# Patient Record
Sex: MALE | Race: WHITE
[De-identification: names, ages, dates, MRNs, and addresses within clinical notes are randomized per-mention and may not be internally consistent; named-entity substitution may affect disease eponyms.]

---

## 2017-10-26 ENCOUNTER — APPOINTMENT (OUTPATIENT)
Dept: RADIOLOGY | Facility: CLINIC | Age: 62
End: 2017-10-26
Payer: COMMERCIAL

## 2017-10-26 ENCOUNTER — OUTPATIENT (OUTPATIENT)
Dept: OUTPATIENT SERVICES | Facility: HOSPITAL | Age: 62
LOS: 1 days | End: 2017-10-26
Payer: COMMERCIAL

## 2017-10-26 DIAGNOSIS — Z00.8 ENCOUNTER FOR OTHER GENERAL EXAMINATION: ICD-10-CM

## 2017-10-26 PROBLEM — Z00.00 ENCOUNTER FOR PREVENTIVE HEALTH EXAMINATION: Status: ACTIVE | Noted: 2017-10-26

## 2017-10-26 PROCEDURE — 73564 X-RAY EXAM KNEE 4 OR MORE: CPT | Mod: 26,LT

## 2017-10-26 PROCEDURE — 73564 X-RAY EXAM KNEE 4 OR MORE: CPT

## 2019-08-04 ENCOUNTER — TRANSCRIPTION ENCOUNTER (OUTPATIENT)
Age: 64
End: 2019-08-04

## 2020-09-08 DIAGNOSIS — Z01.818 ENCOUNTER FOR OTHER PREPROCEDURAL EXAMINATION: ICD-10-CM

## 2020-09-11 ENCOUNTER — APPOINTMENT (OUTPATIENT)
Dept: DISASTER EMERGENCY | Facility: CLINIC | Age: 65
End: 2020-09-11

## 2020-09-11 LAB — SARS-COV-2 N GENE NPH QL NAA+PROBE: NOT DETECTED

## 2020-09-14 ENCOUNTER — RESULT REVIEW (OUTPATIENT)
Age: 65
End: 2020-09-14

## 2020-09-14 ENCOUNTER — OUTPATIENT (OUTPATIENT)
Dept: OUTPATIENT SERVICES | Facility: HOSPITAL | Age: 65
LOS: 1 days | Discharge: HOME | End: 2020-09-14
Payer: COMMERCIAL

## 2020-09-14 VITALS
HEART RATE: 67 BPM | TEMPERATURE: 97 F | RESPIRATION RATE: 23 BRPM | SYSTOLIC BLOOD PRESSURE: 133 MMHG | OXYGEN SATURATION: 99 % | WEIGHT: 285.06 LBS | DIASTOLIC BLOOD PRESSURE: 83 MMHG | HEIGHT: 70 IN

## 2020-09-14 DIAGNOSIS — D17.79 BENIGN LIPOMATOUS NEOPLASM OF OTHER SITES: Chronic | ICD-10-CM

## 2020-09-14 DIAGNOSIS — K42.9 UMBILICAL HERNIA WITHOUT OBSTRUCTION OR GANGRENE: Chronic | ICD-10-CM

## 2020-09-14 DIAGNOSIS — Z90.49 ACQUIRED ABSENCE OF OTHER SPECIFIED PARTS OF DIGESTIVE TRACT: Chronic | ICD-10-CM

## 2020-09-14 DIAGNOSIS — Z80.0 FAMILY HISTORY OF MALIGNANT NEOPLASM OF DIGESTIVE ORGANS: ICD-10-CM

## 2020-09-14 DIAGNOSIS — K46.9 UNSPECIFIED ABDOMINAL HERNIA WITHOUT OBSTRUCTION OR GANGRENE: Chronic | ICD-10-CM

## 2020-09-14 DIAGNOSIS — K22.2 ESOPHAGEAL OBSTRUCTION: ICD-10-CM

## 2020-09-14 PROCEDURE — 93005 ELECTROCARDIOGRAM TRACING: CPT

## 2020-09-14 PROCEDURE — 93010 ELECTROCARDIOGRAM REPORT: CPT

## 2020-09-14 PROCEDURE — 88305 TISSUE EXAM BY PATHOLOGIST: CPT

## 2020-09-14 PROCEDURE — 88313 SPECIAL STAINS GROUP 2: CPT

## 2020-09-14 PROCEDURE — 88305 TISSUE EXAM BY PATHOLOGIST: CPT | Mod: 26

## 2020-09-14 PROCEDURE — C1726: CPT

## 2020-09-14 PROCEDURE — 88313 SPECIAL STAINS GROUP 2: CPT | Mod: 26

## 2020-09-14 NOTE — ASU PATIENT PROFILE, ADULT - PMH
Adenoma    Rheumatoid arthritis involving shoulder with positive rheumatoid factor, unspecified laterality    Schatzki's ring    Sleep apnea, unspecified type

## 2020-09-16 DIAGNOSIS — D12.4 BENIGN NEOPLASM OF DESCENDING COLON: ICD-10-CM

## 2020-09-16 DIAGNOSIS — Z12.11 ENCOUNTER FOR SCREENING FOR MALIGNANT NEOPLASM OF COLON: ICD-10-CM

## 2020-09-16 DIAGNOSIS — M06.9 RHEUMATOID ARTHRITIS, UNSPECIFIED: ICD-10-CM

## 2020-09-16 DIAGNOSIS — K44.9 DIAPHRAGMATIC HERNIA WITHOUT OBSTRUCTION OR GANGRENE: ICD-10-CM

## 2020-09-16 DIAGNOSIS — D12.0 BENIGN NEOPLASM OF CECUM: ICD-10-CM

## 2020-09-16 DIAGNOSIS — K22.2 ESOPHAGEAL OBSTRUCTION: ICD-10-CM

## 2020-09-16 DIAGNOSIS — R13.10 DYSPHAGIA, UNSPECIFIED: ICD-10-CM

## 2020-09-16 DIAGNOSIS — K64.8 OTHER HEMORRHOIDS: ICD-10-CM

## 2020-09-16 DIAGNOSIS — K31.7 POLYP OF STOMACH AND DUODENUM: ICD-10-CM

## 2020-09-16 DIAGNOSIS — Z86.010 PERSONAL HISTORY OF COLONIC POLYPS: ICD-10-CM

## 2020-09-16 DIAGNOSIS — G47.30 SLEEP APNEA, UNSPECIFIED: ICD-10-CM

## 2020-09-16 DIAGNOSIS — Z80.0 FAMILY HISTORY OF MALIGNANT NEOPLASM OF DIGESTIVE ORGANS: ICD-10-CM

## 2021-10-16 ENCOUNTER — NON-APPOINTMENT (OUTPATIENT)
Age: 66
End: 2021-10-16

## 2021-11-16 ENCOUNTER — APPOINTMENT (OUTPATIENT)
Dept: ORTHOPEDIC SURGERY | Facility: CLINIC | Age: 66
End: 2021-11-16
Payer: MEDICARE

## 2021-11-16 ENCOUNTER — NON-APPOINTMENT (OUTPATIENT)
Age: 66
End: 2021-11-16

## 2021-11-16 VITALS
BODY MASS INDEX: 41.46 KG/M2 | HEIGHT: 70 IN | SYSTOLIC BLOOD PRESSURE: 133 MMHG | WEIGHT: 289.6 LBS | HEART RATE: 50 BPM | DIASTOLIC BLOOD PRESSURE: 84 MMHG

## 2021-11-16 DIAGNOSIS — Z82.49 FAMILY HISTORY OF ISCHEMIC HEART DISEASE AND OTHER DISEASES OF THE CIRCULATORY SYSTEM: ICD-10-CM

## 2021-11-16 DIAGNOSIS — M25.561 PAIN IN RIGHT KNEE: ICD-10-CM

## 2021-11-16 DIAGNOSIS — Z83.3 FAMILY HISTORY OF DIABETES MELLITUS: ICD-10-CM

## 2021-11-16 DIAGNOSIS — Z80.0 FAMILY HISTORY OF MALIGNANT NEOPLASM OF DIGESTIVE ORGANS: ICD-10-CM

## 2021-11-16 DIAGNOSIS — M25.562 PAIN IN RIGHT KNEE: ICD-10-CM

## 2021-11-16 DIAGNOSIS — Z86.69 PERSONAL HISTORY OF OTHER DISEASES OF THE NERVOUS SYSTEM AND SENSE ORGANS: ICD-10-CM

## 2021-11-16 DIAGNOSIS — M17.11 UNILATERAL PRIMARY OSTEOARTHRITIS, RIGHT KNEE: ICD-10-CM

## 2021-11-16 PROCEDURE — 99203 OFFICE O/P NEW LOW 30 MIN: CPT

## 2021-11-16 PROCEDURE — 73562 X-RAY EXAM OF KNEE 3: CPT | Mod: 50

## 2021-11-16 PROCEDURE — 72170 X-RAY EXAM OF PELVIS: CPT | Mod: 59

## 2021-11-16 RX ORDER — AMLODIPINE BESYLATE 5 MG/1
5 TABLET ORAL
Refills: 0 | Status: ACTIVE | COMMUNITY

## 2022-02-22 ENCOUNTER — OUTPATIENT (OUTPATIENT)
Dept: OUTPATIENT SERVICES | Facility: HOSPITAL | Age: 67
LOS: 1 days | End: 2022-02-22
Payer: MEDICARE

## 2022-02-22 VITALS
OXYGEN SATURATION: 96 % | WEIGHT: 279.33 LBS | RESPIRATION RATE: 14 BRPM | SYSTOLIC BLOOD PRESSURE: 130 MMHG | HEIGHT: 70 IN | DIASTOLIC BLOOD PRESSURE: 74 MMHG | HEART RATE: 53 BPM | TEMPERATURE: 97 F

## 2022-02-22 DIAGNOSIS — Z90.49 ACQUIRED ABSENCE OF OTHER SPECIFIED PARTS OF DIGESTIVE TRACT: Chronic | ICD-10-CM

## 2022-02-22 DIAGNOSIS — M17.11 UNILATERAL PRIMARY OSTEOARTHRITIS, RIGHT KNEE: ICD-10-CM

## 2022-02-22 DIAGNOSIS — D17.79 BENIGN LIPOMATOUS NEOPLASM OF OTHER SITES: Chronic | ICD-10-CM

## 2022-02-22 DIAGNOSIS — Z98.890 OTHER SPECIFIED POSTPROCEDURAL STATES: Chronic | ICD-10-CM

## 2022-02-22 DIAGNOSIS — K42.9 UMBILICAL HERNIA WITHOUT OBSTRUCTION OR GANGRENE: Chronic | ICD-10-CM

## 2022-02-22 DIAGNOSIS — Z98.49 CATARACT EXTRACTION STATUS, UNSPECIFIED EYE: Chronic | ICD-10-CM

## 2022-02-22 DIAGNOSIS — K46.9 UNSPECIFIED ABDOMINAL HERNIA WITHOUT OBSTRUCTION OR GANGRENE: Chronic | ICD-10-CM

## 2022-02-22 DIAGNOSIS — Z01.818 ENCOUNTER FOR OTHER PREPROCEDURAL EXAMINATION: ICD-10-CM

## 2022-02-22 LAB
A1C WITH ESTIMATED AVERAGE GLUCOSE RESULT: 5.7 % — HIGH (ref 4–5.6)
ALBUMIN SERPL ELPH-MCNC: 3.6 G/DL — SIGNIFICANT CHANGE UP (ref 3.3–5)
ALP SERPL-CCNC: 73 U/L — SIGNIFICANT CHANGE UP (ref 30–120)
ALT FLD-CCNC: 22 U/L DA — SIGNIFICANT CHANGE UP (ref 10–60)
ANION GAP SERPL CALC-SCNC: 6 MMOL/L — SIGNIFICANT CHANGE UP (ref 5–17)
APTT BLD: 31.9 SEC — SIGNIFICANT CHANGE UP (ref 27.5–35.5)
AST SERPL-CCNC: 12 U/L — SIGNIFICANT CHANGE UP (ref 10–40)
BILIRUB SERPL-MCNC: 0.4 MG/DL — SIGNIFICANT CHANGE UP (ref 0.2–1.2)
BUN SERPL-MCNC: 19 MG/DL — SIGNIFICANT CHANGE UP (ref 7–23)
CALCIUM SERPL-MCNC: 8.3 MG/DL — LOW (ref 8.4–10.5)
CHLORIDE SERPL-SCNC: 105 MMOL/L — SIGNIFICANT CHANGE UP (ref 96–108)
CO2 SERPL-SCNC: 26 MMOL/L — SIGNIFICANT CHANGE UP (ref 22–31)
CREAT SERPL-MCNC: 1.08 MG/DL — SIGNIFICANT CHANGE UP (ref 0.5–1.3)
ESTIMATED AVERAGE GLUCOSE: 117 MG/DL — HIGH (ref 68–114)
GLUCOSE SERPL-MCNC: 103 MG/DL — HIGH (ref 70–99)
HCT VFR BLD CALC: 47.3 % — SIGNIFICANT CHANGE UP (ref 39–50)
HGB BLD-MCNC: 15.7 G/DL — SIGNIFICANT CHANGE UP (ref 13–17)
INR BLD: 1.04 RATIO — SIGNIFICANT CHANGE UP (ref 0.88–1.16)
MCHC RBC-ENTMCNC: 30 PG — SIGNIFICANT CHANGE UP (ref 27–34)
MCHC RBC-ENTMCNC: 33.2 GM/DL — SIGNIFICANT CHANGE UP (ref 32–36)
MCV RBC AUTO: 90.4 FL — SIGNIFICANT CHANGE UP (ref 80–100)
MRSA PCR RESULT.: SIGNIFICANT CHANGE UP
NRBC # BLD: 0 /100 WBCS — SIGNIFICANT CHANGE UP (ref 0–0)
PLATELET # BLD AUTO: 218 K/UL — SIGNIFICANT CHANGE UP (ref 150–400)
POTASSIUM SERPL-MCNC: 3.7 MMOL/L — SIGNIFICANT CHANGE UP (ref 3.5–5.3)
POTASSIUM SERPL-SCNC: 3.7 MMOL/L — SIGNIFICANT CHANGE UP (ref 3.5–5.3)
PROT SERPL-MCNC: 6.9 G/DL — SIGNIFICANT CHANGE UP (ref 6–8.3)
PROTHROM AB SERPL-ACNC: 12.6 SEC — SIGNIFICANT CHANGE UP (ref 10.6–13.6)
RBC # BLD: 5.23 M/UL — SIGNIFICANT CHANGE UP (ref 4.2–5.8)
RBC # FLD: 13.2 % — SIGNIFICANT CHANGE UP (ref 10.3–14.5)
S AUREUS DNA NOSE QL NAA+PROBE: SIGNIFICANT CHANGE UP
SODIUM SERPL-SCNC: 137 MMOL/L — SIGNIFICANT CHANGE UP (ref 135–145)
WBC # BLD: 9.25 K/UL — SIGNIFICANT CHANGE UP (ref 3.8–10.5)
WBC # FLD AUTO: 9.25 K/UL — SIGNIFICANT CHANGE UP (ref 3.8–10.5)

## 2022-02-22 PROCEDURE — 85027 COMPLETE CBC AUTOMATED: CPT

## 2022-02-22 PROCEDURE — G0463: CPT

## 2022-02-22 PROCEDURE — 93005 ELECTROCARDIOGRAM TRACING: CPT

## 2022-02-22 PROCEDURE — 80053 COMPREHEN METABOLIC PANEL: CPT

## 2022-02-22 PROCEDURE — 87641 MR-STAPH DNA AMP PROBE: CPT

## 2022-02-22 PROCEDURE — 85730 THROMBOPLASTIN TIME PARTIAL: CPT

## 2022-02-22 PROCEDURE — 93010 ELECTROCARDIOGRAM REPORT: CPT

## 2022-02-22 PROCEDURE — 87640 STAPH A DNA AMP PROBE: CPT

## 2022-02-22 PROCEDURE — 36415 COLL VENOUS BLD VENIPUNCTURE: CPT

## 2022-02-22 PROCEDURE — 83036 HEMOGLOBIN GLYCOSYLATED A1C: CPT

## 2022-02-22 PROCEDURE — 85610 PROTHROMBIN TIME: CPT

## 2022-02-22 RX ORDER — AMLODIPINE BESYLATE 2.5 MG/1
1 TABLET ORAL
Qty: 0 | Refills: 0 | DISCHARGE

## 2022-02-22 NOTE — H&P PST ADULT - FUNCTIONAL ASSESSMENT - BASIC MOBILITY PT AGE POP HIDDEN
Adult DISPLAY PLAN FREE TEXT DISPLAY PLAN FREE TEXT DISPLAY PLAN FREE TEXT DISPLAY PLAN FREE TEXT DISPLAY PLAN FREE TEXT DISPLAY PLAN FREE TEXT

## 2022-02-22 NOTE — H&P PST ADULT - NSICDXFAMILYHX_GEN_ALL_CORE_FT
FAMILY HISTORY:  Father  Still living? No  Family history of colon cancer, Age at diagnosis: Age Unknown    Mother  Still living? No  FHx: kidney failure, Age at diagnosis: Age Unknown

## 2022-02-22 NOTE — H&P PST ADULT - VENOUS THROMBOEMBOLISM
DISCHARGE                         6/10/2019  3:57 PM  ----------------------------------------------------------------------------  Discharged to: Home  Via: private transportation  Accompanied by: Family  Discharge Instructions: *diet, *activity, medications, follow up appointments, when to call the MD, aftercare instructions.  Prescriptions: To be filled by discharge pharmacy; medication list reviewed & sent with pt  Follow Up Appointments: arranged; information given  Belongings: All sent with pt  IV: d/c'd  Telemetry: d/c'd  Pt exhibits understanding of above discharge instructions; all questions answered.    Discharge Paperwork: Signed, copied, and sent home with patient.     Pt cannot find wallet, family will call when they arrive home if found.    no

## 2022-02-22 NOTE — H&P PST ADULT - MUSCULOSKELETAL
details… bilateral knees/no joint swelling/no joint erythema/no joint warmth/no calf tenderness/decreased ROM/decreased ROM due to pain/diminished strength detailed exam

## 2022-02-22 NOTE — H&P PST ADULT - FUNCTIONAL ASSESSMENT - BASIC MOBILITY SCORE.
----- Message from Ingrid Hodges sent at 10/16/2018  3:51 PM CDT -----  Contact: [place] [phone]  Patient is returning a call to the staff. But does not know what it was in regards to. Please call at your earliest convenience.  -------------------------------------------  10/16/18 @ 1651 ([facility])  CALL ATTEMPT TO PT UNSUCCESSFUL , MESSAGE LEFT FOR PT TO RETURN CALL TO OFFICE CONCERNING PAP RESULTS   24

## 2022-02-22 NOTE — H&P PST ADULT - NSICDXPASTSURGICALHX_GEN_ALL_CORE_FT
PAST SURGICAL HISTORY:  H/O arthroscopy of left knee 2018    H/O cataract extraction bilateral 2019    H/O inguinal hernia repair bilateral with umbilical hernia repair 2001    Lipoma of hand 2017    S/P laparoscopic cholecystectomy 2014

## 2022-02-22 NOTE — H&P PST ADULT - HISTORY OF PRESENT ILLNESS
65 yo male presents with 3-4 year history of right knee pain. He received "gel shots" at that time with only 2 days of pain relief. He states that he has treated the pain with tylenol with temporary relief. Pain is 4/10 at rest and increased to 7-8/10 with activity. Pain is currently relieved with extra strength tylenol. He also reports pain in the left knee and states that it will need to be replaced sometime in the future. He has had a prior arthroscopy of the left knee.

## 2022-02-22 NOTE — H&P PST ADULT - PROBLEM SELECTOR PLAN 1
right TKR. medical and cardiac clearances requested. instructed to take amlodipine AM of surgery with sips of water. Patient is a [employer] and will not accept blood transfusions. ERAS and surgical wash instructions reviewed and verbalized understanding. covid PCR appt. confirmed for 3/11/22 at 0800.

## 2022-02-22 NOTE — H&P PST ADULT - RESPIRATORY RATE (BREATHS/MIN)
Received Choice form at 3737  Agency/Facility Name: [facility]  Referral sent per Choice form @ 7030      14

## 2022-02-22 NOTE — H&P PST ADULT - FALL HARM RISK - PATIENT NEEDS ASSISTANCE
"Interdisciplinary Assessment    Music Therapy     Occupational Therapy     Recreation Therapy    SUMMARY:  Pt attended and participated in a structured occupational therapy group session.  During check-in, pt reported feeling \"tired.\"  Pt identified \"myself\" as a support system.  The focus of the group was on making a greeting card for someone else.  Pt wrote on one card for the entire hour. On the envelope, pt wrote, \"To: my horrible parents. From: your depressed son.\" Pt was smiling and laughing while saying this. Pt encouraged to write a card for people who are supportive to him or to write a card for himself when he is feeling sad, down, upset however declined all offers.  Pt had difficulty with focus and attention to task. Easily distracted and appeared more focused on socializing with peers.       Pt filled out occupational therapy assessment.  He identified \"being alive\" as his greatest obstacle to daily life and \"friends\" as the best part of his life.  One thing he would like to change is \"everything.\" He stated being in the hospital makes him feel \"trapped.\"  He identified \"no one\" as social supports and when stressed/overwhelmed, \"listens to music\" to calm down. \"Nothing\" gives him hope for the future.     Patient selected goals:  To be able to set and accomplish goals  To try new things and take risks  To ask others for help or support when needed  \"By the time I leave the hospital I will\"...\"be able to ask for help or support.\"  CLINICAL OBSERVATIONS:             07/29/17 1400   General Information   Date Initially Attended OT 07/28/17   Clinical Impression   Affect Appropriate to situation   Orientation Oriented to person, place and time   Appearance and ADLs General cleanliness observed in most areas   Attention to Internal Stimuli No observed signs   Interaction Skills Interacts appropriately with staff;Interacts appropriately with peers   Ability to Communicate Needs Independent;Indirect   Verbal " Content Articulate;Clear;Appropriate to topic   Ability to Maintain Boundaries Maintains appropriate physical boundaries;Maintains appropriate verbal boundaries   Participation Participates with frequent encouragement   Concentration Concentrates 10-20 minutes;Needs further assessment   Ability to Concentrate Easily distracted;Needs further assessment   Follows and Comprehends Directions Independently follows multi-step directions   Memory Delayed and immediate recall intact   Organization Needs further assessment   Decision Making Independent   Planning and Problem Solving Occasionally needs assist/feedback   Ability to Apply and Learn Concepts Needs further assessment   Frustrations / Stress Tolerance Independently identifies sources of frustration/stress;Independently identifies skills ;Needs further assessment   Level of Insight No insight   Self Esteem Can identify positives   Social Supports Unable to identify any supports                                                                              RECOMMENDATIONS:                                                                                                              .  During individual or group occupational therapy, music therapy or recreational therapy, pt will explore and apply interventions to focus on helping patient to regulate impulse control, learn methods  of dealing with stressors and feelings,  learn to control negative impulses and acting out behaviors, and increase ability to express/manage  anger in appropriate and non-violent ways. Assist patient with exploring satisfying alternatives to aggressive behaviors such as physical outlets for redirection of angry feelings, hobbies, or other individual pursuits.     ADDITIONAL NOTES AND PLAN:                                                                                                        .   None at this time.  Therapists contributing to assessment:  Mode Crowe, SUDHAKAR, OTR/L       No assistance needed

## 2022-02-22 NOTE — H&P PST ADULT - NSICDXPASTMEDICALHX_GEN_ALL_CORE_FT
PAST MEDICAL HISTORY:  COVID-19 vaccine series completed     Gastric ulcer as child    Hypertension     ALISTAIR on CPAP     Osteoarthritis     Schatzki's ring endoscopy every 3 years and stricture is "stretched", last 2021     PAST MEDICAL HISTORY:  COVID-19 vaccine series completed     Gastric ulcer as child    Hypertension     Morbid obesity with BMI of 40.0-44.9, adult     ALISTAIR on CPAP     Osteoarthritis     Schatzki's ring endoscopy every 3 years and stricture is "stretched", last 2021

## 2022-03-03 PROBLEM — E66.01 MORBID (SEVERE) OBESITY DUE TO EXCESS CALORIES: Chronic | Status: ACTIVE | Noted: 2022-02-22

## 2022-03-03 PROBLEM — K25.9 GASTRIC ULCER, UNSPECIFIED AS ACUTE OR CHRONIC, WITHOUT HEMORRHAGE OR PERFORATION: Chronic | Status: ACTIVE | Noted: 2022-02-22

## 2022-03-03 PROBLEM — Z92.29 PERSONAL HISTORY OF OTHER DRUG THERAPY: Chronic | Status: ACTIVE | Noted: 2022-02-22

## 2022-03-03 PROBLEM — G47.33 OBSTRUCTIVE SLEEP APNEA (ADULT) (PEDIATRIC): Chronic | Status: ACTIVE | Noted: 2022-02-22

## 2022-03-03 PROBLEM — K22.2 ESOPHAGEAL OBSTRUCTION: Chronic | Status: ACTIVE | Noted: 2020-09-14

## 2022-03-03 PROBLEM — I10 ESSENTIAL (PRIMARY) HYPERTENSION: Chronic | Status: ACTIVE | Noted: 2022-02-22

## 2022-03-03 PROBLEM — M19.90 UNSPECIFIED OSTEOARTHRITIS, UNSPECIFIED SITE: Chronic | Status: ACTIVE | Noted: 2022-02-22

## 2022-03-11 ENCOUNTER — OUTPATIENT (OUTPATIENT)
Dept: OUTPATIENT SERVICES | Facility: HOSPITAL | Age: 67
LOS: 1 days | End: 2022-03-11
Payer: MEDICARE

## 2022-03-11 VITALS
RESPIRATION RATE: 11 BRPM | HEART RATE: 55 BPM | HEIGHT: 70 IN | DIASTOLIC BLOOD PRESSURE: 79 MMHG | WEIGHT: 274.7 LBS | SYSTOLIC BLOOD PRESSURE: 132 MMHG | TEMPERATURE: 98 F

## 2022-03-11 DIAGNOSIS — Z98.49 CATARACT EXTRACTION STATUS, UNSPECIFIED EYE: Chronic | ICD-10-CM

## 2022-03-11 DIAGNOSIS — Z98.890 OTHER SPECIFIED POSTPROCEDURAL STATES: Chronic | ICD-10-CM

## 2022-03-11 DIAGNOSIS — Z01.818 ENCOUNTER FOR OTHER PREPROCEDURAL EXAMINATION: ICD-10-CM

## 2022-03-11 DIAGNOSIS — Z90.49 ACQUIRED ABSENCE OF OTHER SPECIFIED PARTS OF DIGESTIVE TRACT: Chronic | ICD-10-CM

## 2022-03-11 DIAGNOSIS — D17.79 BENIGN LIPOMATOUS NEOPLASM OF OTHER SITES: Chronic | ICD-10-CM

## 2022-03-11 DIAGNOSIS — Z20.828 CONTACT WITH AND (SUSPECTED) EXPOSURE TO OTHER VIRAL COMMUNICABLE DISEASES: ICD-10-CM

## 2022-03-11 DIAGNOSIS — M17.11 UNILATERAL PRIMARY OSTEOARTHRITIS, RIGHT KNEE: ICD-10-CM

## 2022-03-11 LAB — SARS-COV-2 RNA SPEC QL NAA+PROBE: SIGNIFICANT CHANGE UP

## 2022-03-11 PROCEDURE — U0005: CPT

## 2022-03-11 PROCEDURE — U0003: CPT

## 2022-03-11 NOTE — ASU PATIENT PROFILE, ADULT - PATIENT'S HEIGHT AND WEIGHT RECORDED IN THE VITAL SIGNS FLOWSHEET
Per verbal order by RANJANA Cueto, I removed impacted cerumen from the patient's Bilateral ear(s) using warm water mixed 3:1 with Hydrogen Peroxide,using the Elephant Ear flushing device. Patient tolerated the procedure well. Approximately 15 minutes spent with minimal effort with patient, resulting in Bilateral clear ear canals.        yes

## 2022-03-11 NOTE — ASU PATIENT PROFILE, ADULT - FALL HARM RISK - UNIVERSAL INTERVENTIONS
Bed in lowest position, wheels locked, appropriate side rails in place/Call bell, personal items and telephone in reach/Instruct patient to call for assistance before getting out of bed or chair/Non-slip footwear when patient is out of bed/[place] to call system/Physically safe environment - no spills, clutter or unnecessary equipment/Purposeful Proactive Rounding/Room/bathroom lighting operational, light cord in reach

## 2022-03-11 NOTE — ASU PATIENT PROFILE, ADULT - NSICDXPASTMEDICALHX_GEN_ALL_CORE_FT
PAST MEDICAL HISTORY:  COVID-19 vaccine series completed     Gastric ulcer as child    Hypertension     Morbid obesity with BMI of 40.0-44.9, adult     ALISTAIR on CPAP     Osteoarthritis     Schatzki's ring endoscopy every 3 years and stricture is "stretched", last 2021

## 2022-03-13 ENCOUNTER — FORM ENCOUNTER (OUTPATIENT)
Age: 67
End: 2022-03-13

## 2022-03-14 ENCOUNTER — RESULT REVIEW (OUTPATIENT)
Age: 67
End: 2022-03-14

## 2022-03-14 ENCOUNTER — APPOINTMENT (OUTPATIENT)
Dept: ORTHOPEDIC SURGERY | Facility: HOSPITAL | Age: 67
End: 2022-03-14

## 2022-03-14 ENCOUNTER — OUTPATIENT (OUTPATIENT)
Dept: OUTPATIENT SERVICES | Facility: HOSPITAL | Age: 67
LOS: 1 days | End: 2022-03-14
Payer: MEDICARE

## 2022-03-14 ENCOUNTER — TRANSCRIPTION ENCOUNTER (OUTPATIENT)
Age: 67
End: 2022-03-14

## 2022-03-14 DIAGNOSIS — Z90.49 ACQUIRED ABSENCE OF OTHER SPECIFIED PARTS OF DIGESTIVE TRACT: Chronic | ICD-10-CM

## 2022-03-14 DIAGNOSIS — M17.11 UNILATERAL PRIMARY OSTEOARTHRITIS, RIGHT KNEE: ICD-10-CM

## 2022-03-14 DIAGNOSIS — Z98.49 CATARACT EXTRACTION STATUS, UNSPECIFIED EYE: Chronic | ICD-10-CM

## 2022-03-14 DIAGNOSIS — Z98.890 OTHER SPECIFIED POSTPROCEDURAL STATES: Chronic | ICD-10-CM

## 2022-03-14 DIAGNOSIS — D17.79 BENIGN LIPOMATOUS NEOPLASM OF OTHER SITES: Chronic | ICD-10-CM

## 2022-03-14 DIAGNOSIS — Z01.818 ENCOUNTER FOR OTHER PREPROCEDURAL EXAMINATION: ICD-10-CM

## 2022-03-14 PROCEDURE — 27447 TOTAL KNEE ARTHROPLASTY: CPT | Mod: RT

## 2022-03-14 PROCEDURE — 99222 1ST HOSP IP/OBS MODERATE 55: CPT

## 2022-03-14 PROCEDURE — 88305 TISSUE EXAM BY PATHOLOGIST: CPT | Mod: 26

## 2022-03-14 PROCEDURE — 73560 X-RAY EXAM OF KNEE 1 OR 2: CPT | Mod: 26,RT

## 2022-03-14 PROCEDURE — 88311 DECALCIFY TISSUE: CPT | Mod: 26

## 2022-03-14 DEVICE — COMP PATELLA TRI-PEG E-PLUS POLY 9X35MM: Type: IMPLANTABLE DEVICE | Status: FUNCTIONAL

## 2022-03-14 DEVICE — INSERT TIB EMPOWR VVC SZ 8 12MM: Type: IMPLANTABLE DEVICE | Status: FUNCTIONAL

## 2022-03-14 DEVICE — INSERT TIB NONPOROUS UNIV SZ  8 RT: Type: IMPLANTABLE DEVICE | Status: FUNCTIONAL

## 2022-03-14 DEVICE — IMPLANTABLE DEVICE: Type: IMPLANTABLE DEVICE | Status: FUNCTIONAL

## 2022-03-14 DEVICE — BONE WAX 2.5GM: Type: IMPLANTABLE DEVICE | Status: FUNCTIONAL

## 2022-03-14 DEVICE — CEMENT BONE COBALT G-HV: Type: IMPLANTABLE DEVICE | Status: FUNCTIONAL

## 2022-03-14 DEVICE — COMP FEM NON POROUS SZ 8 RT: Type: IMPLANTABLE DEVICE | Status: FUNCTIONAL

## 2022-03-14 RX ORDER — PANTOPRAZOLE SODIUM 20 MG/1
40 TABLET, DELAYED RELEASE ORAL
Refills: 0 | Status: DISCONTINUED | OUTPATIENT
Start: 2022-03-14 | End: 2022-03-28

## 2022-03-14 RX ORDER — SODIUM CHLORIDE 9 MG/ML
500 INJECTION INTRAMUSCULAR; INTRAVENOUS; SUBCUTANEOUS ONCE
Refills: 0 | Status: COMPLETED | OUTPATIENT
Start: 2022-03-14 | End: 2022-03-14

## 2022-03-14 RX ORDER — ACETAMINOPHEN 500 MG
1000 TABLET ORAL ONCE
Refills: 0 | Status: COMPLETED | OUTPATIENT
Start: 2022-03-14 | End: 2022-03-14

## 2022-03-14 RX ORDER — SODIUM CHLORIDE 9 MG/ML
1000 INJECTION INTRAMUSCULAR; INTRAVENOUS; SUBCUTANEOUS ONCE
Refills: 0 | Status: COMPLETED | OUTPATIENT
Start: 2022-03-14 | End: 2022-03-14

## 2022-03-14 RX ORDER — ONDANSETRON 8 MG/1
4 TABLET, FILM COATED ORAL ONCE
Refills: 0 | Status: DISCONTINUED | OUTPATIENT
Start: 2022-03-14 | End: 2022-03-14

## 2022-03-14 RX ORDER — ASPIRIN/CALCIUM CARB/MAGNESIUM 324 MG
1 TABLET ORAL
Qty: 83 | Refills: 0
Start: 2022-03-14 | End: 2022-04-24

## 2022-03-14 RX ORDER — HYDROMORPHONE HYDROCHLORIDE 2 MG/ML
0.5 INJECTION INTRAMUSCULAR; INTRAVENOUS; SUBCUTANEOUS
Refills: 0 | Status: DISCONTINUED | OUTPATIENT
Start: 2022-03-14 | End: 2022-03-14

## 2022-03-14 RX ORDER — CEFAZOLIN SODIUM 1 G
3000 VIAL (EA) INJECTION ONCE
Refills: 0 | Status: COMPLETED | OUTPATIENT
Start: 2022-03-14 | End: 2022-03-14

## 2022-03-14 RX ORDER — CELECOXIB 200 MG/1
1 CAPSULE ORAL
Qty: 60 | Refills: 0
Start: 2022-03-14 | End: 2022-04-12

## 2022-03-14 RX ORDER — ASPIRIN/CALCIUM CARB/MAGNESIUM 324 MG
81 TABLET ORAL
Refills: 0 | Status: DISCONTINUED | OUTPATIENT
Start: 2022-03-15 | End: 2022-03-28

## 2022-03-14 RX ORDER — CELECOXIB 200 MG/1
200 CAPSULE ORAL EVERY 12 HOURS
Refills: 0 | Status: DISCONTINUED | OUTPATIENT
Start: 2022-03-14 | End: 2022-03-28

## 2022-03-14 RX ORDER — OMEPRAZOLE 10 MG/1
1 CAPSULE, DELAYED RELEASE ORAL
Qty: 30 | Refills: 1
Start: 2022-03-14 | End: 2022-05-12

## 2022-03-14 RX ORDER — ACETAMINOPHEN 500 MG
1000 TABLET ORAL EVERY 8 HOURS
Refills: 0 | Status: DISCONTINUED | OUTPATIENT
Start: 2022-03-14 | End: 2022-03-28

## 2022-03-14 RX ORDER — MAGNESIUM HYDROXIDE 400 MG/1
30 TABLET, CHEWABLE ORAL DAILY
Refills: 0 | Status: DISCONTINUED | OUTPATIENT
Start: 2022-03-14 | End: 2022-03-28

## 2022-03-14 RX ORDER — SENNA PLUS 8.6 MG/1
2 TABLET ORAL AT BEDTIME
Refills: 0 | Status: DISCONTINUED | OUTPATIENT
Start: 2022-03-14 | End: 2022-03-28

## 2022-03-14 RX ORDER — CHLORHEXIDINE GLUCONATE 213 G/1000ML
1 SOLUTION TOPICAL ONCE
Refills: 0 | Status: COMPLETED | OUTPATIENT
Start: 2022-03-14 | End: 2022-03-14

## 2022-03-14 RX ORDER — CEFAZOLIN SODIUM 1 G
3000 VIAL (EA) INJECTION EVERY 8 HOURS
Refills: 0 | Status: COMPLETED | OUTPATIENT
Start: 2022-03-14 | End: 2022-03-15

## 2022-03-14 RX ORDER — OXYCODONE HYDROCHLORIDE 5 MG/1
10 TABLET ORAL
Refills: 0 | Status: DISCONTINUED | OUTPATIENT
Start: 2022-03-14 | End: 2022-03-14

## 2022-03-14 RX ORDER — POLYETHYLENE GLYCOL 3350 17 G/17G
17 POWDER, FOR SOLUTION ORAL AT BEDTIME
Refills: 0 | Status: DISCONTINUED | OUTPATIENT
Start: 2022-03-14 | End: 2022-03-28

## 2022-03-14 RX ORDER — OXYCODONE HYDROCHLORIDE 5 MG/1
5 TABLET ORAL
Refills: 0 | Status: DISCONTINUED | OUTPATIENT
Start: 2022-03-14 | End: 2022-03-14

## 2022-03-14 RX ORDER — ONDANSETRON 8 MG/1
4 TABLET, FILM COATED ORAL EVERY 6 HOURS
Refills: 0 | Status: DISCONTINUED | OUTPATIENT
Start: 2022-03-14 | End: 2022-03-28

## 2022-03-14 RX ORDER — SODIUM CHLORIDE 9 MG/ML
1000 INJECTION, SOLUTION INTRAVENOUS
Refills: 0 | Status: DISCONTINUED | OUTPATIENT
Start: 2022-03-14 | End: 2022-03-14

## 2022-03-14 RX ORDER — HYDROMORPHONE HYDROCHLORIDE 2 MG/ML
0.5 INJECTION INTRAMUSCULAR; INTRAVENOUS; SUBCUTANEOUS
Refills: 0 | Status: DISCONTINUED | OUTPATIENT
Start: 2022-03-14 | End: 2022-03-21

## 2022-03-14 RX ORDER — AMLODIPINE BESYLATE 2.5 MG/1
5 TABLET ORAL DAILY
Refills: 0 | Status: DISCONTINUED | OUTPATIENT
Start: 2022-03-16 | End: 2022-03-28

## 2022-03-14 RX ORDER — SODIUM CHLORIDE 9 MG/ML
1000 INJECTION, SOLUTION INTRAVENOUS
Refills: 0 | Status: DISCONTINUED | OUTPATIENT
Start: 2022-03-14 | End: 2022-03-28

## 2022-03-14 RX ORDER — TRANEXAMIC ACID 100 MG/ML
1000 INJECTION, SOLUTION INTRAVENOUS ONCE
Refills: 0 | Status: COMPLETED | OUTPATIENT
Start: 2022-03-14 | End: 2022-03-14

## 2022-03-14 RX ORDER — DEXAMETHASONE 0.5 MG/5ML
8 ELIXIR ORAL ONCE
Refills: 0 | Status: COMPLETED | OUTPATIENT
Start: 2022-03-15 | End: 2022-03-15

## 2022-03-14 RX ORDER — APREPITANT 80 MG/1
40 CAPSULE ORAL ONCE
Refills: 0 | Status: COMPLETED | OUTPATIENT
Start: 2022-03-14 | End: 2022-03-14

## 2022-03-14 RX ORDER — CEFAZOLIN SODIUM 1 G
2000 VIAL (EA) INJECTION EVERY 8 HOURS
Refills: 0 | Status: DISCONTINUED | OUTPATIENT
Start: 2022-03-14 | End: 2022-03-14

## 2022-03-14 RX ADMIN — Medication 200 MILLIGRAM(S): at 16:54

## 2022-03-14 RX ADMIN — APREPITANT 40 MILLIGRAM(S): 80 CAPSULE ORAL at 06:40

## 2022-03-14 RX ADMIN — Medication 1000 MILLIGRAM(S): at 16:36

## 2022-03-14 RX ADMIN — SODIUM CHLORIDE 100 MILLILITER(S): 9 INJECTION, SOLUTION INTRAVENOUS at 20:59

## 2022-03-14 RX ADMIN — Medication 1000 MILLIGRAM(S): at 21:00

## 2022-03-14 RX ADMIN — OXYCODONE HYDROCHLORIDE 5 MILLIGRAM(S): 5 TABLET ORAL at 20:59

## 2022-03-14 RX ADMIN — SENNA PLUS 2 TABLET(S): 8.6 TABLET ORAL at 21:00

## 2022-03-14 RX ADMIN — SODIUM CHLORIDE 500 MILLILITER(S): 9 INJECTION INTRAMUSCULAR; INTRAVENOUS; SUBCUTANEOUS at 16:53

## 2022-03-14 RX ADMIN — CHLORHEXIDINE GLUCONATE 1 APPLICATION(S): 213 SOLUTION TOPICAL at 06:38

## 2022-03-14 RX ADMIN — POLYETHYLENE GLYCOL 3350 17 GRAM(S): 17 POWDER, FOR SOLUTION ORAL at 21:00

## 2022-03-14 RX ADMIN — CELECOXIB 200 MILLIGRAM(S): 200 CAPSULE ORAL at 21:00

## 2022-03-14 RX ADMIN — Medication 400 MILLIGRAM(S): at 16:22

## 2022-03-14 RX ADMIN — SODIUM CHLORIDE 75 MILLILITER(S): 9 INJECTION, SOLUTION INTRAVENOUS at 11:11

## 2022-03-14 RX ADMIN — OXYCODONE HYDROCHLORIDE 5 MILLIGRAM(S): 5 TABLET ORAL at 21:30

## 2022-03-14 RX ADMIN — CELECOXIB 200 MILLIGRAM(S): 200 CAPSULE ORAL at 22:07

## 2022-03-14 RX ADMIN — Medication 1000 MILLIGRAM(S): at 21:41

## 2022-03-14 RX ADMIN — SODIUM CHLORIDE 500 MILLILITER(S): 9 INJECTION INTRAMUSCULAR; INTRAVENOUS; SUBCUTANEOUS at 11:11

## 2022-03-14 NOTE — PHYSICAL THERAPY INITIAL EVALUATION ADULT - GAIT DEVIATIONS NOTED, PT EVAL
decreased crispin/decreased velocity of limb motion/decreased step length/decreased weight-shifting ability

## 2022-03-14 NOTE — OCCUPATIONAL THERAPY INITIAL EVALUATION ADULT - ADL RETRAINING, OT EVAL
Patient will tolerate standing at the sink for 3-5 minutes grooming independently using RW. Patient will complete UB dressing with independence seated at the edge of bed in 1-2 therapy sessions.

## 2022-03-14 NOTE — OCCUPATIONAL THERAPY INITIAL EVALUATION ADULT - PERTINENT HX OF CURRENT PROBLEM, REHAB EVAL
67 y/o male p/w 3-4 year history of right knee pain. He received "gel shots" at that time with only 2 days of pain relief. He states that he has treated the pain with tylenol with temporary relief. Pain is 4/10 at rest and increased to 7-8/10 with activity.  3/14 s/p R TKR

## 2022-03-14 NOTE — OCCUPATIONAL THERAPY INITIAL EVALUATION ADULT - DIAGNOSIS, OT EVAL
Patient presents with decreased strength, balance, safety awareness and independence for ADLs and functional transfers.

## 2022-03-14 NOTE — OCCUPATIONAL THERAPY INITIAL EVALUATION ADULT - ADDITIONAL COMMENTS
Pt lives in a private home with his wife with 2 steps to enter and 2 steps up to his bathroom with no handrail. Pt has a tub shower with a curtain. Pt reports to be completely independent with ADLs, IADLs and functional t/fs prior to elective surgery.

## 2022-03-14 NOTE — DISCHARGE NOTE PROVIDER - HOSPITAL COURSE
This patient was admitted to [facility] with a history of severe degenerative joint disease of the right knee.  Patient underwent Pre-Surgical Testing and was medically cleared to undergo elective procedure. Patient underwent right TKR by Dr. Charbel Rivera on 3/14/22. Procedure was well tolerated.  No operative or ceferino-operative complications arose during patient's hospital course.  Patient received antibiotic according to SCIP guidelines for infection prevention.  Aspirin 81mg q 12 h was given for DVT prophylaxis, in addition to the use of SCDs.  Anesthesia, Medical Hospitalist, Physical Therapy and Occupational Therapy were consulted. Patient is stable for discharge with a good prognosis.  Appropriate discharge instructions and medications are provided in this document.

## 2022-03-14 NOTE — CONSULT NOTE ADULT - ASSESSMENT
65 yo male presents with 3-4 year history of right knee pain. He received "gel shots" at that time with only 2 days of pain relief.    htn - [facility] rx regimen and BP control  post op care - I emani - dvt p - PT - Ortho f/u - wound care -   pain rx regimen - caution with Opioids in pt with ALISTAIR  ALISTAIR - sleep hygiene discussed - CPAP night time - keep sat > 88 pct -   weight management discussion -

## 2022-03-14 NOTE — PHYSICAL THERAPY INITIAL EVALUATION ADULT - IMPAIRMENTS CONTRIBUTING TO GAIT DEVIATIONS, PT EVAL
Controlled Substance Refill Request  Medication Name:   Requested Prescriptions     Pending Prescriptions Disp Refills     zolpidem (AMBIEN CR) 6.25 MG CR tablet [Pharmacy Med Name: Zolpidem Tartrate ER Oral Tablet Extended Release 6.25 MG] 45 tablet 0     Sig: take 1 and 1/4 tablet by mouth at bedtime.     Date Last Fill: 4/12/21  [facility]ed [facility]acy: [facility]  Submit electronically to pharmacy  Controlled Substance Agreement on file:   Encounter-Level CSA Scan Date - 01/08/2019:    Scan on 1/14/2019 10:21 AM        Last office visit:  7/16/20        
Med sent to pharmacy Thanks.   
impaired balance/narrow base of support/impaired postural control/decreased ROM/decreased sensation/decreased strength

## 2022-03-14 NOTE — OCCUPATIONAL THERAPY INITIAL EVALUATION ADULT - GENERAL OBSERVATIONS, REHAB EVAL
Patient received supine in bed, +telemonitor, +cyrocuff R knee, +b/l SCDs, +pulse oximeter, +IV, NAD.

## 2022-03-14 NOTE — DISCHARGE NOTE PROVIDER - CARE PROVIDER_API CALL
Charbel Rivera)  Orthopedic Surgery  [address], [address]  [place] [address]  Phone: [phone]  Fax: [phone]  Established Patient  Scheduled Appointment: 03/28/2022 09:40 AM

## 2022-03-14 NOTE — DISCHARGE NOTE PROVIDER - NSDCFUSCHEDAPPT_GEN_ALL_CORE_FT
JUAN DIEGO OBREGON ; 03/28/2022 ; [facility] [address] [facility]  JUAN DIEGO OBREGON ; 05/10/2022 ; [facility]

## 2022-03-14 NOTE — DISCHARGE NOTE PROVIDER - PROVIDER TOKENS
PROVIDER:[TOKEN:[[id number]],SCHEDULEDAPPT:[03/28/2022],SCHEDULEDAPPTTIME:[09:40 AM],ESTABLISHEDPATIENT:[T]]

## 2022-03-14 NOTE — DISCHARGE NOTE NURSING/CASE MANAGEMENT/SOCIAL WORK - NSDCPEFALRISK_GEN_ALL_CORE
For information on Fall & Injury Prevention, visit: [email or web address] OR  [email or web address] OR  https://www.cdc.gov/steadi/patient.html

## 2022-03-14 NOTE — PHYSICAL THERAPY INITIAL EVALUATION ADULT - ADDITIONAL COMMENTS
Pt lives in private home with wife. Home has 2 TIMOTHY without handrail and 2 steps inside without handrail. Pt was independent prior to surgery. He has RW and commode at home.

## 2022-03-14 NOTE — PHYSICAL THERAPY INITIAL EVALUATION ADULT - IMPAIRED TRANSFERS: SIT/STAND, REHAB EVAL
impaired balance/impaired coordination/narrow base of support/impaired postural control/decreased ROM/decreased strength

## 2022-03-14 NOTE — CONSULT NOTE ADULT - SUBJECTIVE AND OBJECTIVE BOX
HPI: 66M HTN, HLD, ALISTAIR, and OA has been combatting pain in right knee for several years which has progressively worsened.  Patient has tried multiple options for pain relief including OTC medication and as well as PT with minimal relief and has undergone elective replacement of right knee successfully.  Patient is currently resting in bed comfortable with good pain control.     REVIEW OF SYSTEMS:  CONSTITUTIONAL: No fever, weight loss, or fatigue  EYES: No eye pain, visual disturbances, or discharge  ENMT:  No difficulty hearing, tinnitus, vertigo; No sinus or throat pain  NECK: No pain or stiffness  RESPIRATORY: No cough, wheezing, chills or hemoptysis; No shortness of breath  CARDIOVASCULAR: No chest pain, palpitations, dizziness, or leg swelling  GASTROINTESTINAL: No abdominal or epigastric pain. No nausea, vomiting, or hematemesis; No diarrhea or constipation. No melena or hematochezia.  GENITOURINARY: No dysuria, frequency, hematuria, or incontinence  NEUROLOGICAL: No headaches, memory loss, loss of strength, numbness, or tremors  MUSCULOSKELETAL: No muscle or back pain      PAST MEDICAL & SURGICAL HISTORY:  Jacqueline&#x27;s ring  endoscopy every 3 years and stricture is &quot;stretched&quot;, last 2021    Hypertension    Osteoarthritis    ALISTAIR on CPAP    COVID-19 vaccine series completed    Gastric ulcer  as child    Morbid obesity with BMI of 40.0-44.9, adult    Lipoma of hand  2017    H/O arthroscopy of left knee  2018    S/P laparoscopic cholecystectomy  2014    H/O inguinal hernia repair  bilateral with umbilical hernia repair 2001    H/O cataract extraction  bilateral 2019        SOCIAL HISTORY:  Tobacco; EtOH; Illicit Drugs    Allergies    No Known Allergies    Intolerances        MEDICATIONS  (STANDING):  acetaminophen     Tablet .. 1000 milliGRAM(s) Oral every 8 hours  ceFAZolin   IVPB 3000 milliGRAM(s) IV Intermittent every 8 hours  celecoxib 200 milliGRAM(s) Oral every 12 hours  dexAMETHasone  IVPB 8 milliGRAM(s) IV Intermittent once  HYDROmorphone  Injectable 0.5 milliGRAM(s) IV Push every 3 hours  lactated ringers. 1000 milliLiter(s) (100 mL/Hr) IV Continuous <Continuous>  pantoprazole    Tablet 40 milliGRAM(s) Oral before breakfast  polyethylene glycol 3350 17 Gram(s) Oral at bedtime  senna 2 Tablet(s) Oral at bedtime    MEDICATIONS  (PRN):  magnesium hydroxide Suspension 30 milliLiter(s) Oral daily PRN Constipation  ondansetron Injectable 4 milliGRAM(s) IV Push every 6 hours PRN Nausea and/or Vomiting  oxyCODONE    IR 5 milliGRAM(s) Oral every 3 hours PRN Moderate Pain (4 - 6)  oxyCODONE    IR 10 milliGRAM(s) Oral every 3 hours PRN Severe Pain (7 - 10)      FAMILY HISTORY:  Family history of colon cancer (Father)    FHx: kidney failure (Mother)        Vital Signs Last 24 Hrs  T(C): 36.7 (14 Mar 2022 15:12), Max: 36.7 (14 Mar 2022 15:12)  T(F): 98.1 (14 Mar 2022 15:12), Max: 98.1 (14 Mar 2022 15:12)  HR: 62 (14 Mar 2022 15:12) (49 - 79)  BP: 129/77 (14 Mar 2022 15:12) (113/64 - 142/80)  BP(mean): --  RR: 16 (14 Mar 2022 15:12) (12 - 20)  SpO2: 98% (14 Mar 2022 15:12) (95% - 100%)    PHYSICAL EXAM:    GENERAL: NAD, well-developed  HEAD:  Atraumatic, Normocephalic  EYES: EOMI, PERRLA, conjunctiva and sclera clear  ENMT: No tonsillar erythema, exudates, or enlargement; Moist mucous membranes, Good dentition, No lesions  NECK: Supple, No JVD, Normal thyroid  NERVOUS SYSTEM:  Alert & Oriented X3, Good concentration;  CHEST/LUNG: Clear to auscultation bilaterally; No rales, rhonchi, wheezing, or rubs  HEART: Regular rate and rhythm; No murmurs, rubs, or gallops  ABDOMEN: Soft, Nontender, Nondistended; Bowel sounds present  EXTREMITIES:  2+ Peripheral Pulses, No clubbing, cyanosis, or edema      LABS:              CAPILLARY BLOOD GLUCOSE          RADIOLOGY & ADDITIONAL STUDIES:    EKG:   Personally Reviewed:  [ ] YES     Imaging:   Personally Reviewed:  [ ] YES     Consultant(s) Notes Reviewed:      Care Discussed with Consultants/Other Providers:               
Date/Time Patient Seen:  		  Referring MD:   Data Reviewed	       Patient is a 66y old  Male who presents with a chief complaint of Right TKR for severe right knee OA.  (14 Mar 2022 10:59)      Subjective/HPI  vs noted  labs reviewed  imaging reviewed  H and P reviewed  post op today      Hospital Course:  Admission Date	14-Mar-2022 05:47  Reason for Admission	Right TKR for severe right knee OA.  Hospital Course	  This patient was admitted to [facility] with a history of severe degenerative joint disease of the right knee.  Patient underwent Pre-Surgical Testing and was medically cleared to undergo elective procedure. Patient underwent right TKR by Dr. Charbel Rivera on 3/14/22. Procedure was well tolerated.  No operative or ceferino-operative complications arose during patient's hospital course.  Patient received antibiotic according to SCIP guidelines for infection prevention.  Aspirin 81mg q 12 h was given for DVT prophylaxis, in addition to the use of SCDs.  Anesthesia, Medical Hospitalist, Physical Therapy and Occupational Therapy were consulted. Patient is stable for discharge with a good prognosis.  Appropriate discharge instructions and medications are provided in this document.   PAST MEDICAL & SURGICAL HISTORY:  Adenoma    Rheumatoid arthritis involving shoulder with positive rheumatoid factor, unspecified laterality    Tristinki&#x27;s ring  endoscopy every 3 years and stricture is &quot;stretched&quot;, last 2021    Sleep apnea, unspecified type    Hypertension    Osteoarthritis    ALISTAIR on CPAP    COVID-19 vaccine series completed    Gastric ulcer  as child    Morbid obesity with BMI of 40.0-44.9, adult    History of cholecystectomy    Umbilical hernia    Abdominal hernia    Lipoma of hand  2017    H/O arthroscopy of left knee  2018    S/P laparoscopic cholecystectomy  2014    H/O inguinal hernia repair  bilateral with umbilical hernia repair 2001    H/O cataract extraction  bilateral 2019    ·  PRE-OP DIAGNOSIS:  Right knee DJD 14-Mar-2022 10:10:17  Katelyn Bunch.  ·  POST-OP DIAGNOSIS:  Right knee DJD 14-Mar-2022 10:12:20  Katelyn Bunch.  ·  PROCEDURES:  Total knee replacement 14-Mar-2022 10:10:03 right Katelyn Bunch.        Medication list         MEDICATIONS  (STANDING):  acetaminophen     Tablet .. 1000 milliGRAM(s) Oral every 8 hours  ceFAZolin   IVPB 3000 milliGRAM(s) IV Intermittent every 8 hours  celecoxib 200 milliGRAM(s) Oral every 12 hours  dexAMETHasone  IVPB 8 milliGRAM(s) IV Intermittent once  HYDROmorphone  Injectable 0.5 milliGRAM(s) IV Push every 3 hours  lactated ringers. 1000 milliLiter(s) (100 mL/Hr) IV Continuous <Continuous>  pantoprazole    Tablet 40 milliGRAM(s) Oral before breakfast  polyethylene glycol 3350 17 Gram(s) Oral at bedtime  senna 2 Tablet(s) Oral at bedtime    MEDICATIONS  (PRN):  magnesium hydroxide Suspension 30 milliLiter(s) Oral daily PRN Constipation  ondansetron Injectable 4 milliGRAM(s) IV Push every 6 hours PRN Nausea and/or Vomiting  oxyCODONE    IR 5 milliGRAM(s) Oral every 3 hours PRN Moderate Pain (4 - 6)  oxyCODONE    IR 10 milliGRAM(s) Oral every 3 hours PRN Severe Pain (7 - 10)         Vitals log        ICU Vital Signs Last 24 Hrs  T(C): 36.7 (14 Mar 2022 15:12), Max: 36.7 (14 Mar 2022 15:12)  T(F): 98.1 (14 Mar 2022 15:12), Max: 98.1 (14 Mar 2022 15:12)  HR: 62 (14 Mar 2022 15:12) (49 - 79)  BP: 129/77 (14 Mar 2022 15:12) (113/64 - 142/80)  BP(mean): --  ABP: --  ABP(mean): --  RR: 16 (14 Mar 2022 15:12) (12 - 20)  SpO2: 98% (14 Mar 2022 15:12) (95% - 100%)       FAMILY HISTORY:  Father  Still living? No  Family history of colon cancer, Age at diagnosis: Age Unknown    Mother  Still living? No  FHx: kidney failure, Age at diagnosis: Age Unknown.     COVID-19 Vaccine Assessment:  · History of COVID-19 vaccination	Yes  · Brand of COVID-19 vaccination	Pfizer dose 1 and 2  · Date of last vaccination	21-Jul-2021  · Have you had a COVID-19 booster?	Yes  · Brand of COVID-19 booster	Pfizer  · Date of latest COVID-19 booster	14-Jan-2022  · Will the patient accept the COVID-19 vaccine if eligible and it is available?	Not applicable     Social History:  · Marital Status	  · Occupation	  · Lives With	children; spouse     Substance Use History:  · Substance Use	caffeine  denied illicit drug use  · Caffeine Type	pop/soda  · Caffeine Amount/Frequency	occasional use     Alcohol Use History:  · Have you ever consumed alcohol	yes...  · Alcohol Type	beer  · Alcohol Frequency	monthly or less  · Alcohol Amount	1-2 drinks  · Problems Related to Alcohol Use	no  · 1. Have you felt you ought to CUT down on your drinking?	no  · 2. Have people ANNOYED you by criticizing your drinking?	no  · 3. Have you ever felt bad or GUILTY about your drinking?	no  · 4. Have you ever needed an "EYE OPENER", a drink first thing in the morning to steady your nerves or get rid of a hangover?	no     Tobacco Usage:  · Tobacco Usage: Never smoker     Passive Smoke Exposure:  · Passive Smoke Exposure	No    Presurgical Screening:    Cardiovascular:  · Activity	sedentary  · Energy expenditure (mets)	mets >4  · Symptoms	none     Cardiac Tests:  · Last Echocardiogram	none  · Last Stress Test	>10 years ago  · Last Cardiac Angiogram/Imaging	none     Sleep Apnea Screening:  Confirmed Obstructive Sleep Apnea (ALISTAIR)?: Yes  neck 18 inches  Treatment: CPAP  Contact information for sleep study results: PCP     Airway:  · Airway	normal  · Mallampati Score	Class II - visualization of the soft palate, fauces, and uvula  · Dentition	multiple secure crowns     Anesthesia History:  · Previous Reaction to Anesthesia	none; awakens during procedures     Transfusion History:  · Blood Avoidance/Restrictions	Scientologist beliefs  · Previous Blood Transfusion	no    Core Measures/Disease Management:    Heart Failure:  Does this patient have a history of or has been diagnosed with heart failure? no.    Clinical Risk Assessment:    Sepsis:  · Does patient meet criteria for Sepsis	No     Catheterizations/Incisions/Drainage:  · Urinary Catheter	no  · Central Venous Catheter/PICC Line	no  · Surgical Site Incision	no  · Venous Thromboembolism	no  · Pulmonary Embolus	no  · Pressure Injury	no      VTE Risk Factor Assessment:   · Age	(2) age 61-74 years  · BMI	(1) obesity (BMI greater than 25)  · History	(0) indicator not present  · Current Status	(1) other risk factor (includes escalating BMI, pack-years of smoking, diabetes requiring insulin, chemotherapy, female gender and length of surgery)  · Current Labs/Test Results	none  · For Women Only	(0) indicator not present  · VTE Score	4     Hepatitis C Status:  · Hepatitis C Status	Negative       Devices:  · Implants/Medical Devices	None    Health Management:    Health Management:  · Pap smear test done in past 3 years	not applicable (Male)     Reproductive, Female:  · Is Patient Pregnant?	not applicable (Male)     Reproductive, Male:  · Last Prostate Exam	2021    Review of Systems:   · General	negative  · Skin/Breast	negative  · Ophthalmologic	negative  · ENMT	negative  · Respiratory and Thorax	negative  · Cardiovascular	negative  · Negative Gastrointestinal Symptoms	no nausea; no vomiting; no diarrhea; no constipation; no change in bowel habits  · Genitourinary	negative  · Musculoskeletal Symptoms	arthritis; joint pain; stiffness; bilateral knees  · Neurological	negative  · Psychiatric	negative  · Hematology/Lymphatics	negative  · Endocrine	negative  · Allergic/Immunologic	negative       Height/Weight/BSA/BMI:  · Height (FEET)	5 Feet  · Height (INCHES)	10 Inch(s)  · Height (CENTIMETERS)	177.8 Centimeter(s)  · Dosing Weight (KILOGRAMS)	126.7 kg  · Dosing Weight  (POUNDS)	279.3 Pound(s)  · BSA (m2)	2.41 Meter Squared  · BMI (kG/m2)	40.1      Input and Output:  I&O's Detail    14 Mar 2022 07:01  -  14 Mar 2022 18:12  --------------------------------------------------------  IN:    Lactated Ringers: 1300 mL    Oral Fluid: 640 mL    Sodium Chloride 0.9% Bolus: 500 mL  Total IN: 2440 mL    OUT:    Blood Loss (mL): 100 mL    Voided (mL): 300 mL  Total OUT: 400 mL    Total NET: 2040 mL          Lab Data                  Review of Systems	  post op    Objective     Physical Examination    obese  head nc  head at  heart s1s2  lung dec BS  abd soft  cn grossly int  verbal  alert      Pertinent Lab findings & Imaging      Portillo:  NO   Adequate UO     I&O's Detail    14 Mar 2022 07:01  -  14 Mar 2022 18:12  --------------------------------------------------------  IN:    Lactated Ringers: 1300 mL    Oral Fluid: 640 mL    Sodium Chloride 0.9% Bolus: 500 mL  Total IN: 2440 mL    OUT:    Blood Loss (mL): 100 mL    Voided (mL): 300 mL  Total OUT: 400 mL    Total NET: 2040 mL               Discussed with:     Cultures:	        Radiology      ACC: [id number] EXAM:  XR KNEE 1-2 VIEWS RT                          PROCEDURE DATE:  03/14/2022          INTERPRETATION:  Evaluation right TKR    2 views right knee portable postop in OR.    Right TKR with intact hardware satisfactory alignment. No fractures.   Postoperative soft tissue changes.    IMPRESSION: Satisfactory postoperative appearance right TKR    --- End of Report ---            SHANIQUE HERNANDEZ MD; Attending Radiologist  This document has been electronically signed. Mar 14 2022  2:23PM        Ventricular Rate 50 BPM    Atrial Rate 50 BPM    P-R Interval 172 ms    QRS Duration 92 ms    Q-T Interval 416 ms    QTC Calculation(Bazett) 379 ms    P Axis 42 degrees    R Axis 2 degrees    T Axis 10 degrees    Diagnosis Line Sinus bradycardia  Otherwise normal ECG  No previous ECGs available  Confirmed by SHARRI YUSUF MD ([id number]) on 2/22/2022 8:55:39 AM

## 2022-03-14 NOTE — CONSULT NOTE ADULT - ASSESSMENT
66M HTN, HLD, ALISTAIR, and OA admitted for aftercare following Right TKR    S/P Right TKR  3/14/22  - Continue Bowel and pain control regimen;  Incentive Spirometer for lung expansion; Monitor Hgb and follow up electrolytes.      HTN / HLD - Amlodipine + Statin    Diet - Regular    DVT Prophylaxis - Aspirin BID    Disposition - Discharge planning pending hospital course   66M HTN, HLD, ALISTAIR, and OA admitted for aftercare following Right TKR    S/P Right TKR  3/14/22  - Continue Bowel and pain control regimen;  Incentive Spirometer for lung expansion; Monitor Hgb and follow up electrolytes.      HTN / HLD - Amlodipine + Statin    ALISTAIR - CPAP at bedtime. Pulmonary Consulted    Diet - Regular    DVT Prophylaxis - Aspirin BID    Disposition - Discharge planning pending hospital course

## 2022-03-14 NOTE — DISCHARGE NOTE PROVIDER - NSDCMRMEDTOKEN_GEN_ALL_CORE_FT
amLODIPine 5 mg oral tablet: 1 tab(s) orally once a day  Tylenol 500 mg oral tablet: 2 tab(s) orally every 6 hours, As Needed - for severe pain   amLODIPine 5 mg oral tablet: 1 tab(s) orally once a day  aspirin 81 mg oral delayed release tablet: 1 tab orally every 12 hours. Take 2 hours before Celebrex.   celecoxib 200 mg oral capsule: 1 cap orally every 12 hours. Take 2 hours after Aspirin.  omeprazole 20 mg oral delayed release capsule: 1 cap orally once a day   Tylenol 500 mg oral tablet: 2 tab(s) orally every 6 hours, As Needed - for severe pain   acetaminophen 500 mg oral tablet: 2 tab(s) orally every 8 hours  amLODIPine 5 mg oral tablet: 1 tab(s) orally once a day  aspirin 81 mg oral delayed release tablet: 1 tab orally every 12 hours. Take 2 hours before Celebrex.   celecoxib 200 mg oral capsule: 1 cap orally every 12 hours. Take 2 hours after Aspirin.  omeprazole 20 mg oral delayed release capsule: 1 cap orally once a day   oxyCODONE 5 mg oral tablet: 1-2 tab(s) orally every 4 hours, As Needed -for moderate-severe pain MDD:6   Reference #: [id number]

## 2022-03-14 NOTE — DISCHARGE NOTE NURSING/CASE MANAGEMENT/SOCIAL WORK - PATIENT PORTAL LINK FT
You can access the FollowMyHealth Patient Portal offered by [facility] by registering at the following website: [email or web address]. By joining [facility]’s FollowMyHealth portal, you will also be able to view your health information using other applications (apps) compatible with our system.

## 2022-03-14 NOTE — PHYSICAL THERAPY INITIAL EVALUATION ADULT - PERTINENT HX OF CURRENT PROBLEM, REHAB EVAL
67 yo male presents with 3-4 year history of right knee pain. He received "gel shots" at that time with only 2 days of pain relief. He states that he has treated the pain with tylenol with temporary relief. Pain is 4/10 at rest and increased to 7-8/10 with activity.

## 2022-03-14 NOTE — PHYSICAL THERAPY INITIAL EVALUATION ADULT - RANGE OF MOTION EXAMINATION, REHAB EVAL
Right LE ROM to be assessed next session/bilateral upper extremity ROM was WFL (within functional limits)/Left LE ROM was WFL (within functional limits)

## 2022-03-14 NOTE — OCCUPATIONAL THERAPY INITIAL EVALUATION ADULT - RANGE OF MOTION EXAMINATION, UPPER EXTREMITY
3 = assistive equipment and person Left UE Active ROM was WNL (within normal limits)/Right UE Active ROM was WNL (within normal limits)

## 2022-03-14 NOTE — DISCHARGE NOTE NURSING/CASE MANAGEMENT/SOCIAL WORK - NSSCNAMETXT_GEN_ALL_CORE
[facility] (formerly [facility])   [address] [place] [address]

## 2022-03-14 NOTE — DISCHARGE NOTE PROVIDER - NSDCCPTREATMENT_GEN_ALL_CORE_FT
PRINCIPAL PROCEDURE  Procedure: Right total knee replacement  Findings and Treatment: Severe DJD right knee.

## 2022-03-15 VITALS
DIASTOLIC BLOOD PRESSURE: 79 MMHG | SYSTOLIC BLOOD PRESSURE: 143 MMHG | RESPIRATION RATE: 15 BRPM | TEMPERATURE: 98 F | HEART RATE: 48 BPM | OXYGEN SATURATION: 95 %

## 2022-03-15 LAB
ANION GAP SERPL CALC-SCNC: 7 MMOL/L — SIGNIFICANT CHANGE UP (ref 5–17)
BUN SERPL-MCNC: 12 MG/DL — SIGNIFICANT CHANGE UP (ref 7–23)
CALCIUM SERPL-MCNC: 7.9 MG/DL — LOW (ref 8.4–10.5)
CHLORIDE SERPL-SCNC: 105 MMOL/L — SIGNIFICANT CHANGE UP (ref 96–108)
CO2 SERPL-SCNC: 26 MMOL/L — SIGNIFICANT CHANGE UP (ref 22–31)
CREAT SERPL-MCNC: 0.87 MG/DL — SIGNIFICANT CHANGE UP (ref 0.5–1.3)
EGFR: 96 ML/MIN/1.73M2 — SIGNIFICANT CHANGE UP
GLUCOSE SERPL-MCNC: 137 MG/DL — HIGH (ref 70–99)
HCT VFR BLD CALC: 40.4 % — SIGNIFICANT CHANGE UP (ref 39–50)
HGB BLD-MCNC: 13.2 G/DL — SIGNIFICANT CHANGE UP (ref 13–17)
MCHC RBC-ENTMCNC: 29.5 PG — SIGNIFICANT CHANGE UP (ref 27–34)
MCHC RBC-ENTMCNC: 32.7 GM/DL — SIGNIFICANT CHANGE UP (ref 32–36)
MCV RBC AUTO: 90.2 FL — SIGNIFICANT CHANGE UP (ref 80–100)
NRBC # BLD: 0 /100 WBCS — SIGNIFICANT CHANGE UP (ref 0–0)
PLATELET # BLD AUTO: 213 K/UL — SIGNIFICANT CHANGE UP (ref 150–400)
POTASSIUM SERPL-MCNC: 4 MMOL/L — SIGNIFICANT CHANGE UP (ref 3.5–5.3)
POTASSIUM SERPL-SCNC: 4 MMOL/L — SIGNIFICANT CHANGE UP (ref 3.5–5.3)
RBC # BLD: 4.48 M/UL — SIGNIFICANT CHANGE UP (ref 4.2–5.8)
RBC # FLD: 13.1 % — SIGNIFICANT CHANGE UP (ref 10.3–14.5)
SODIUM SERPL-SCNC: 138 MMOL/L — SIGNIFICANT CHANGE UP (ref 135–145)
WBC # BLD: 19.61 K/UL — HIGH (ref 3.8–10.5)
WBC # FLD AUTO: 19.61 K/UL — HIGH (ref 3.8–10.5)

## 2022-03-15 PROCEDURE — 99213 OFFICE O/P EST LOW 20 MIN: CPT

## 2022-03-15 RX ORDER — ACETAMINOPHEN 500 MG
2 TABLET ORAL
Qty: 0 | Refills: 0 | DISCHARGE
Start: 2022-03-15

## 2022-03-15 RX ORDER — ACETAMINOPHEN 500 MG
2 TABLET ORAL
Qty: 0 | Refills: 0 | DISCHARGE

## 2022-03-15 RX ORDER — OXYCODONE HYDROCHLORIDE 5 MG/1
1 TABLET ORAL
Qty: 42 | Refills: 0
Start: 2022-03-15 | End: 2022-03-21

## 2022-03-15 RX ADMIN — Medication 101.6 MILLIGRAM(S): at 05:32

## 2022-03-15 RX ADMIN — Medication 200 MILLIGRAM(S): at 00:08

## 2022-03-15 RX ADMIN — PANTOPRAZOLE SODIUM 40 MILLIGRAM(S): 20 TABLET, DELAYED RELEASE ORAL at 05:32

## 2022-03-15 RX ADMIN — Medication 1000 MILLIGRAM(S): at 13:47

## 2022-03-15 RX ADMIN — Medication 1000 MILLIGRAM(S): at 05:32

## 2022-03-15 RX ADMIN — Medication 81 MILLIGRAM(S): at 05:32

## 2022-03-15 RX ADMIN — CELECOXIB 200 MILLIGRAM(S): 200 CAPSULE ORAL at 09:25

## 2022-03-15 NOTE — PROGRESS NOTE ADULT - ASSESSMENT
65 yo male presents with 3-4 year history of right knee pain. He received "gel shots" at that time with only 2 days of pain relief.    enc use of CPAP night time  POD 1  VS noted    htn - cvs rx regimen and BP control  post op care - I emani - dvt p - PT - Ortho f/u - wound care -   pain rx regimen - caution with Opioids in pt with ALISTAIR  ALISTAIR - sleep hygiene discussed - CPAP night time - keep sat > 88 pct -   weight management discussion - 
 66M HTN, HLD, ALISTAIR, and OA admitted for aftercare following Right TKR    S/P Right TKR  3/14/22  - Continue Bowel and pain control regimen;  Incentive Spirometer for lung expansion; Monitor Hgb and follow up electrolytes.     Leukocytosis - Most likely reactive; Remains afebrile and no signs or symptoms of Infection; Monitor Off Abx and will trend CBC      HTN / HLD - Amlodipine + Statin    ALISTAIR - CPAP at bedtime. Pulmonary Consulted    Diet - Regular    DVT Prophylaxis - Aspirin BID    Disposition - Patient medically optimized for discharge home if cleared by PT and Ortho.

## 2022-03-15 NOTE — PROGRESS NOTE ADULT - SUBJECTIVE AND OBJECTIVE BOX
Subjective: Patient seen and examined.  No overnight events.     MEDICATIONS  (STANDING):  acetaminophen     Tablet .. 1000 milliGRAM(s) Oral every 8 hours  aspirin enteric coated 81 milliGRAM(s) Oral two times a day  celecoxib 200 milliGRAM(s) Oral every 12 hours  HYDROmorphone  Injectable 0.5 milliGRAM(s) IV Push every 3 hours  lactated ringers. 1000 milliLiter(s) (100 mL/Hr) IV Continuous <Continuous>  pantoprazole    Tablet 40 milliGRAM(s) Oral before breakfast  polyethylene glycol 3350 17 Gram(s) Oral at bedtime  senna 2 Tablet(s) Oral at bedtime    MEDICATIONS  (PRN):  magnesium hydroxide Suspension 30 milliLiter(s) Oral daily PRN Constipation  ondansetron Injectable 4 milliGRAM(s) IV Push every 6 hours PRN Nausea and/or Vomiting  oxyCODONE    IR 5 milliGRAM(s) Oral every 3 hours PRN Moderate Pain (4 - 6)  oxyCODONE    IR 10 milliGRAM(s) Oral every 3 hours PRN Severe Pain (7 - 10)      Allergies    No Known Allergies    Intolerances        Vital Signs Last 24 Hrs  T(C): 36.6 (15 Mar 2022 07:55), Max: 36.8 (14 Mar 2022 19:09)  T(F): 97.9 (15 Mar 2022 07:55), Max: 98.2 (14 Mar 2022 19:09)  HR: 48 (15 Mar 2022 07:55) (48 - 79)  BP: 143/79 (15 Mar 2022 07:55) (114/76 - 143/79)  BP(mean): --  RR: 15 (15 Mar 2022 07:55) (14 - 20)  SpO2: 95% (15 Mar 2022 07:55) (95% - 100%)    PHYSICAL EXAM:  GENERAL: NAD, well-groomed, well-developed  HEAD:  Atraumatic, Normocephalic  ENMT: Moist mucous membranes,   NECK: Supple, No JVD, Normal thyroid  NERVOUS SYSTEM:  All 4 extremities mobile, no gross sensory deficits.   CHEST/LUNG: Clear to auscultation bilaterally; No rales, rhonchi, wheezing, or rubs  HEART: Regular rate and rhythm; No murmurs, rubs, or gallops  ABDOMEN: Soft, Nontender, Nondistended; Bowel sounds present  EXTREMITIES:  2+ Peripheral Pulses, No clubbing, cyanosis, or edema      LABS:                        13.2   19.61 )-----------( 213      ( 15 Mar 2022 07:56 )             40.4     15 Mar 2022 07:56    138    |  105    |  12     ----------------------------<  137    4.0     |  26     |  0.87     Ca    7.9        15 Mar 2022 07:56          CAPILLARY BLOOD GLUCOSE          RADIOLOGY & ADDITIONAL TESTS:    Imaging Personally Reviewed:  [ ] YES     Consultant(s) Notes Reviewed:      Care Discussed with Consultants/Other Providers:    Advanced Directives: [ ] DNR  [ ] No feeding tube  [ ] MOLST in chart  [ ] MOLST completed today  [ ] Unknown  
Date/Time Patient Seen:  		  Referring MD:   Data Reviewed	       Patient is a 66y old  Male who presents with a chief complaint of Right TKR for severe right knee OA.  (14 Mar 2022 10:59)      Subjective/HPI     PAST MEDICAL & SURGICAL HISTORY:  Adenoma    Rheumatoid arthritis involving shoulder with positive rheumatoid factor, unspecified laterality    Jacqueline&#x27;s ring  endoscopy every 3 years and stricture is &quot;stretched&quot;, last 2021    Sleep apnea, unspecified type    Hypertension    Osteoarthritis    ALISTAIR on CPAP    COVID-19 vaccine series completed    Gastric ulcer  as child    Morbid obesity with BMI of 40.0-44.9, adult    History of cholecystectomy    Umbilical hernia    Abdominal hernia    Lipoma of hand  2017    H/O arthroscopy of left knee  2018    S/P laparoscopic cholecystectomy  2014    H/O inguinal hernia repair  bilateral with umbilical hernia repair 2001    H/O cataract extraction  bilateral 2019          Medication list         MEDICATIONS  (STANDING):  acetaminophen     Tablet .. 1000 milliGRAM(s) Oral every 8 hours  aspirin enteric coated 81 milliGRAM(s) Oral two times a day  celecoxib 200 milliGRAM(s) Oral every 12 hours  HYDROmorphone  Injectable 0.5 milliGRAM(s) IV Push every 3 hours  lactated ringers. 1000 milliLiter(s) (100 mL/Hr) IV Continuous <Continuous>  pantoprazole    Tablet 40 milliGRAM(s) Oral before breakfast  polyethylene glycol 3350 17 Gram(s) Oral at bedtime  senna 2 Tablet(s) Oral at bedtime    MEDICATIONS  (PRN):  magnesium hydroxide Suspension 30 milliLiter(s) Oral daily PRN Constipation  ondansetron Injectable 4 milliGRAM(s) IV Push every 6 hours PRN Nausea and/or Vomiting  oxyCODONE    IR 5 milliGRAM(s) Oral every 3 hours PRN Moderate Pain (4 - 6)  oxyCODONE    IR 10 milliGRAM(s) Oral every 3 hours PRN Severe Pain (7 - 10)         Vitals log        ICU Vital Signs Last 24 Hrs  T(C): 36.6 (15 Mar 2022 02:55), Max: 36.8 (14 Mar 2022 19:09)  T(F): 97.8 (15 Mar 2022 02:55), Max: 98.2 (14 Mar 2022 19:09)  HR: 55 (15 Mar 2022 02:55) (49 - 79)  BP: 122/76 (15 Mar 2022 02:55) (113/64 - 142/80)  BP(mean): --  ABP: --  ABP(mean): --  RR: 16 (15 Mar 2022 02:55) (12 - 20)  SpO2: 98% (15 Mar 2022 02:55) (95% - 100%)           Input and Output:  I&O's Detail    14 Mar 2022 07:01  -  15 Mar 2022 06:21  --------------------------------------------------------  IN:    Lactated Ringers: 1300 mL    Oral Fluid: 640 mL    Sodium Chloride 0.9% Bolus: 500 mL  Total IN: 2440 mL    OUT:    Blood Loss (mL): 100 mL    Voided (mL): 500 mL  Total OUT: 600 mL    Total NET: 1840 mL          Lab Data                  Review of Systems	      Objective     Physical Examination    heart s1s2  lung dec BS  abd soft  head nc      Pertinent Lab findings & Imaging      Lindsey:  NO   Adequate UO     I&O's Detail    14 Mar 2022 07:01  -  15 Mar 2022 06:21  --------------------------------------------------------  IN:    Lactated Ringers: 1300 mL    Oral Fluid: 640 mL    Sodium Chloride 0.9% Bolus: 500 mL  Total IN: 2440 mL    OUT:    Blood Loss (mL): 100 mL    Voided (mL): 500 mL  Total OUT: 600 mL    Total NET: 1840 mL               Discussed with:     Cultures:	        Radiology                            
Discharge medication calendar:  Aspirin EC 81mg q12h x 6 weeks  APAP 1000mg q8h x 2-3 weeks  Celecoxib 200mg q12h x 2-3 weeks  Omeprazole 20mg QAM x 6 weeks  Narcotic PRN  Docusate 100mg TID while taking narcotic  Miralax, Senna, or Bisacodyl PRN for treatment of constipation  
Post Op     JUAN DIEGO OBREGON      66y        Male                                                                                                                 T(C): 36.5 (03-14-22 @ 10:12), Max: 36.5 (03-14-22 @ 10:12)  HR: 74 (03-14-22 @ 13:00) (49 - 79)  BP: 125/72 (03-14-22 @ 13:00) (113/64 - 142/80)  RR: 15 (03-14-22 @ 13:00) (12 - 20)  SpO2: 95% (03-14-22 @ 13:00) (95% - 100%)  Wt(kg): --    S/P   total knee replacement    Patient denies shortness of breath, chest pain, dyspnea, No complaints  Pain is 3/10    Physical Exam    Extremity: Bilaterally:  No holmon                                           No Cord                                          PAS on                                          Neurovascular intact                                          Motor intact EHL/FHL                                          Sensation intact                                          Pulses intact DP/PT                                         Calves Soft                                         Dressing Clean / Dry / Intact                                         Capillary refill with 5 seconds                A/P  -- S/P total knee replacement    -  Medicine To Follow   - DVT prophylaxis PAS ASA 81mg po bid  - PT & OT   - Analagesia  - Incentive Spirometry  - Discharge Planning  - Safety Precautions  -  CBC , BMP daily    
Post Op Day #1    SUBJECTIVE    65yo Male status post right TKR .   Patient is alert and comfortable.    Pain is controlled with current pain regimen.  Denies nausea, vomiting, chest pain, shortness of breath, abdominal pain or fever.   No new complaints.    OBJECTIVE    Vital Signs Last 24 Hrs  T(C): 36.6 (15 Mar 2022 07:55), Max: 36.8 (14 Mar 2022 19:09)  T(F): 97.9 (15 Mar 2022 07:55), Max: 98.2 (14 Mar 2022 19:09)  HR: 48 (15 Mar 2022 07:55) (48 - 79)  BP: 143/79 (15 Mar 2022 07:55) (113/64 - 143/79)  BP(mean): --  RR: 15 (15 Mar 2022 07:55) (12 - 20)  SpO2: 95% (15 Mar 2022 07:55) (95% - 100%)  I&O's Summary    14 Mar 2022 07:01  -  15 Mar 2022 07:00  --------------------------------------------------------  IN: 3540 mL / OUT: 900 mL / NET: 2640 mL        PHYSICAL EXAM    Right knee incision  is clean, dry and intact.   No erythema/ No exudate/ No blistering/ No ecchymosis.   The calf is supple/nontender.   Sensation to light touch is grossly intact distally.   Motor function distally is intact.   No foot drop.   (2+) dorsalis pedis pulse. Capillary refill is less than 2 seconds. No cyanosis.                          13.2   19.61<H> )-----------( 213      ( 15 Mar 2022 07:56 )             40.4   15 Mar 2022 07:56    15 Mar 2022 07:56    138    |  105    |  12     ----------------------------<  137<H>  4.0     |  26     |  0.87     Ca    7.9<L>      15 Mar 2022 07:56        ASSESSMENT AND PLAN  - Orthopedically stable  -Leukocytosis, asypmtomatic. Likely reactive in setting of recent surgery and perioperative steroids  - DVT prophylaxis: PAS + Ecotrin 81mg twice daily  - Continue physical therapy and occupational therapy  - Weight bearing as tolerated of the right lower extremity with assistance of a walker  - Incentive spirometry encouraged  - Pain control as clinically indicated  - Disposition:  Home when medically stable and cleared by PT/OT

## 2022-03-16 PROCEDURE — 73560 X-RAY EXAM OF KNEE 1 OR 2: CPT

## 2022-03-16 PROCEDURE — C1889: CPT

## 2022-03-16 PROCEDURE — C1713: CPT

## 2022-03-16 PROCEDURE — 97116 GAIT TRAINING THERAPY: CPT

## 2022-03-16 PROCEDURE — 88311 DECALCIFY TISSUE: CPT

## 2022-03-16 PROCEDURE — 80048 BASIC METABOLIC PNL TOTAL CA: CPT

## 2022-03-16 PROCEDURE — 97535 SELF CARE MNGMENT TRAINING: CPT

## 2022-03-16 PROCEDURE — 97161 PT EVAL LOW COMPLEX 20 MIN: CPT

## 2022-03-16 PROCEDURE — 97530 THERAPEUTIC ACTIVITIES: CPT

## 2022-03-16 PROCEDURE — 88305 TISSUE EXAM BY PATHOLOGIST: CPT

## 2022-03-16 PROCEDURE — 97110 THERAPEUTIC EXERCISES: CPT

## 2022-03-16 PROCEDURE — 94664 DEMO&/EVAL PT USE INHALER: CPT

## 2022-03-16 PROCEDURE — 85027 COMPLETE CBC AUTOMATED: CPT

## 2022-03-16 PROCEDURE — C1776: CPT

## 2022-03-16 PROCEDURE — 36415 COLL VENOUS BLD VENIPUNCTURE: CPT

## 2022-03-16 PROCEDURE — 27447 TOTAL KNEE ARTHROPLASTY: CPT | Mod: RT

## 2022-03-16 PROCEDURE — 94660 CPAP INITIATION&MGMT: CPT

## 2022-03-31 ENCOUNTER — APPOINTMENT (OUTPATIENT)
Dept: ORTHOPEDIC SURGERY | Facility: CLINIC | Age: 67
End: 2022-03-31
Payer: MEDICARE

## 2022-03-31 VITALS — HEART RATE: 71 BPM | DIASTOLIC BLOOD PRESSURE: 85 MMHG | SYSTOLIC BLOOD PRESSURE: 138 MMHG

## 2022-03-31 PROCEDURE — 73562 X-RAY EXAM OF KNEE 3: CPT | Mod: RT

## 2022-03-31 PROCEDURE — 99024 POSTOP FOLLOW-UP VISIT: CPT

## 2022-03-31 NOTE — HISTORY OF PRESENT ILLNESS
[___ Weeks Post Op] : [unfilled] weeks post op [3] : the patient reports pain that is 3/10 in severity [Clean/Dry/Intact] : clean, dry and intact [Swelling] : swollen [Neuro Intact] : an unremarkable neurological exam [Vascular Intact] : ~T peripheral vascular exam normal [Negative Zeus's] : maneuvers demonstrated a negative Zeus's sign [Xray (Date:___)] : [unfilled] Xray -  [Hardware in Good Position] : hardware in good position [No Obvious Fractures] : no obvious fractures [Good Overall Alignment] : good overall alignment [Doing Well] : is doing well [Excellent Pain Control] : has excellent pain control [No Sign of Infection] : is showing no signs of infection [Chills] : no chills [Constipation] : no constipation [Diarrhea] : no diarrhea [Dysuria] : no dysuria [Fever] : no fever [Nausea] : no nausea [Vomiting] : no vomiting [Erythema] : not erythematous [Discharge] : absent of discharge [Dehiscence] : not dehisced [de-identified] : Patient is a 66 year old male S/P right total knee  replacement performed at [facility] on 3/14/22. Patient presents today for his first post operative visit and Dermabond tape removal. [de-identified] : The patient was discharged from the hospital with home care services and home exercises. He verbalized doing well  overall. The patient reports pain of 3/10, "most discomfort". He is taking  Tylenol for pain relief. Patient is using EC. Aspirin 81 MG twice a day for DVT prophylaxis and Celebrex to decrease inflammation. [de-identified] : The patient is ambulating with mild antalgic gait utilizing a cane. Patient is S/P right total knee replacement. The knee is with Dermabond tape intact. The surgical incision site is without redness, heat or drainage. No palpable hematoma or effusion. No calf tenderness. Positive distal pulses. The active ROM of the right knee easily from 0 to 120 degrees. No evidence of ligament laxity, muscle atrophy, motor or sensory deficit. No flexion contracture or extension lag noted. Good quadriceps formation on extension and flexion and no palpable deficits. The right lower extremity is with mild swelling. There is no evidence of infection or cellulitis on the incision site. [de-identified] : 3 views of the right knee was obtained at today's visit. X-rays reveal a well-positioned, well fixed total knee replacement in good alignment. There is no obvious evidence of fractures, dislocation or osteolysis. [de-identified] : Right total knee replacement [de-identified] : Dermabond tape was  removed from the right knee and replaced with Steri-Strips. Patient tolerated  it well. Incisional care was reviewed with the patient. The patient was advised of the nature of the healing process and of the importance of adhering to the DVT prophylaxis with Aspirin 81 MG BID for a total of 6 weeks post operative. Patient to continue with physical therapy and home exercises as recommended. Prescription was given for out patient physical therapy. Patient was encouraged to engage in isometric exercises to maintain the knee in full  extension. He was advised to continue to apply ice to the knee 3-4 times a day for 20 minutes and keep the right lower extremity elevated above the level of the heart to decrease swelling. Prescription was given for antibiotics Amoxicillin for dental prophylaxis as per protocol. Patient to make a follow up appointment to see Dr. Rivera in 6 weeks. He was educated on the signs and symptoms of infection to report. Patient verbalized understanding of all instructions and all of his questions were addressed to his satisfaction. Patient was advised to call this office if he has new questions or concerns.

## 2022-05-10 ENCOUNTER — APPOINTMENT (OUTPATIENT)
Dept: ORTHOPEDIC SURGERY | Facility: CLINIC | Age: 67
End: 2022-05-10
Payer: MEDICARE

## 2022-05-10 VITALS
HEIGHT: 70 IN | DIASTOLIC BLOOD PRESSURE: 84 MMHG | HEART RATE: 66 BPM | BODY MASS INDEX: 39.8 KG/M2 | SYSTOLIC BLOOD PRESSURE: 137 MMHG | WEIGHT: 278 LBS

## 2022-05-10 DIAGNOSIS — Z96.651 PRESENCE OF RIGHT ARTIFICIAL KNEE JOINT: ICD-10-CM

## 2022-05-10 PROCEDURE — 73562 X-RAY EXAM OF KNEE 3: CPT | Mod: RT

## 2022-05-10 PROCEDURE — 99024 POSTOP FOLLOW-UP VISIT: CPT

## 2022-06-29 NOTE — DISCHARGE NOTE PROVIDER - NSDCHOSPICE_GEN_A_CORE
Patient Education        Neck: Exercises  Introduction  Here are some examples of exercises for you to try. The exercises may be suggested for a condition or for rehabilitation. Start each exercise slowly. Ease off the exercises if you start to have pain. You will be told when to start these exercises and which ones will work bestfor you. How to do the exercises  Neck stretch    1. This stretch works best if you keep your shoulder down as you lean away from it. To help you remember to do this, start by relaxing your shoulders and lightly holding on to your thighs or your chair. 2. Tilt your head toward your shoulder and hold for 15 to 30 seconds. Let the weight of your head stretch your muscles. 3. If you would like a little added stretch, use your hand to gently and steadily pull your head toward your shoulder. For example, keeping your right shoulder down, lean your head to the left. 4. Repeat 2 to 4 times toward each shoulder. Diagonal neck stretch    1. Turn your head slightly toward the direction you will be stretching, and tilt your head diagonally toward your chest and hold for 15 to 30 seconds. 2. If you would like a little added stretch, use your hand to gently and steadily pull your head forward on the diagonal.  3. Repeat 2 to 4 times toward each side. Dorsal glide stretch    The dorsal glide stretches the back of the neck. If you feel pain, do not glide so far back. Some people find this exercise easier to do while lying on theirbacks with an ice pack on the neck. 1. Sit or stand tall and look straight ahead. 2. Slowly tuck your chin as you glide your head backward over your body  3. Hold for a count of 6, and then relax for up to 10 seconds. 4. Repeat 8 to 12 times. Chest and shoulder stretch    1. Sit or stand tall and glide your head backward as in the dorsal glide stretch. 2. Raise both arms so that your hands are next to your ears.   3. Take a deep breath, and as you breathe out, lower your elbows down and behind your back. You will feel your shoulder blades slide down and together, and at the same time you will feel a stretch across your chest and the front of your shoulders. 4. Hold for about 6 seconds, and then relax for up to 10 seconds. 5. Repeat 8 to 12 times. Strengthening: Hands on head    1. Move your head backward, forward, and side to side against gentle pressure from your hands, holding each position for about 6 seconds. 2. Repeat 8 to 12 times. Follow-up care is a key part of your treatment and safety. Be sure to make and go to all appointments, and call your doctor if you are having problems. It's also a good idea to know your test results and keep alist of the medicines you take. Where can you learn more? Go to [email or web address]://[email or web address]. org and sign in to your [employer] account. Enter P975 in the Image[employer] box to learn more about \"Neck: Exercises. \"     If you do not have an account, please click on the \"Sign Up Now\" link. Current as of: March 9, 2022               Content Version: 13.3  © 5308-6906 [employer]. Care instructions adapted under license by [facility] ([facility]). If you have questions about a medical condition or this instruction, always ask your healthcare professional. [address] any warranty or liability for your use of this information. Patient Education        Neck: Exercises  Introduction  Here are some examples of exercises for you to try. The exercises may be suggested for a condition or for rehabilitation. Start each exercise slowly. Ease off the exercises if you start to have pain. You will be told when to start these exercises and which ones will work bestfor you. How to do the exercises  Neck stretch    5. This stretch works best if you keep your shoulder down as you lean away from it.  To help you remember to do this, start by relaxing your shoulders and lightly holding on to your thighs or your chair. 6. Tilt your head toward your shoulder and hold for 15 to 30 seconds. Let the weight of your head stretch your muscles. 7. If you would like a little added stretch, use your hand to gently and steadily pull your head toward your shoulder. For example, keeping your right shoulder down, lean your head to the left. 8. Repeat 2 to 4 times toward each shoulder. Diagonal neck stretch    4. Turn your head slightly toward the direction you will be stretching, and tilt your head diagonally toward your chest and hold for 15 to 30 seconds. 5. If you would like a little added stretch, use your hand to gently and steadily pull your head forward on the diagonal.  6. Repeat 2 to 4 times toward each side. Dorsal glide stretch    The dorsal glide stretches the back of the neck. If you feel pain, do not glide so far back. Some people find this exercise easier to do while lying on theirbacks with an ice pack on the neck. 5. Sit or stand tall and look straight ahead. 6. Slowly tuck your chin as you glide your head backward over your body  7. Hold for a count of 6, and then relax for up to 10 seconds. 8. Repeat 8 to 12 times. Chest and shoulder stretch    6. Sit or stand tall and glide your head backward as in the dorsal glide stretch. 7. Raise both arms so that your hands are next to your ears. 8. Take a deep breath, and as you breathe out, lower your elbows down and behind your back. You will feel your shoulder blades slide down and together, and at the same time you will feel a stretch across your chest and the front of your shoulders. 9. Hold for about 6 seconds, and then relax for up to 10 seconds. 10. Repeat 8 to 12 times. Strengthening: Hands on head    3. Move your head backward, forward, and side to side against gentle pressure from your hands, holding each position for about 6 seconds. 4. Repeat 8 to 12 times. Follow-up care is a key part of your treatment and safety.  Be sure to make and go to all appointments, and call your doctor if you are having problems. It's also a good idea to know your test results and keep alist of the medicines you take. Where can you learn more? Go to [email or web address]://[employer].[email or web address]. org and sign in to your [employer] account. Enter P975 in the [facility] box to learn more about \"Neck: Exercises. \"     If you do not have an account, please click on the \"Sign Up Now\" link. Current as of: March 9, 2022               Content Version: 13.3  © 2331-0658 [employer]. Care instructions adapted under license by [facility] ([facility]). If you have questions about a medical condition or this instruction, always ask your healthcare professional. [address] any warranty or liability for your use of this information. No

## 2022-07-09 ENCOUNTER — APPOINTMENT (OUTPATIENT)
Dept: ORTHOPEDIC SURGERY | Facility: CLINIC | Age: 67
End: 2022-07-09

## 2022-07-09 VITALS
HEART RATE: 49 BPM | SYSTOLIC BLOOD PRESSURE: 131 MMHG | HEIGHT: 70 IN | BODY MASS INDEX: 38.65 KG/M2 | WEIGHT: 270 LBS | DIASTOLIC BLOOD PRESSURE: 86 MMHG

## 2022-07-09 DIAGNOSIS — M17.12 UNILATERAL PRIMARY OSTEOARTHRITIS, LEFT KNEE: ICD-10-CM

## 2022-07-09 PROCEDURE — 73562 X-RAY EXAM OF KNEE 3: CPT | Mod: LT

## 2022-07-09 PROCEDURE — 99214 OFFICE O/P EST MOD 30 MIN: CPT

## 2022-07-09 NOTE — HISTORY OF PRESENT ILLNESS
[de-identified] : Patient presents today for initial evaluation of left knee pain.  The patient reports of ongoing knee pain for many years.  He did have previous gel injections.  He reports he has had knee pain for over 5 years.  He underwent a right knee replacement back in March.  He has done very well with the right knee following his replacement.  He reports of increasing left knee pain because of increased activity as well is performing physical therapy.  He is here today to discuss proceeding with a left knee replacement.  No postoperative complications from the right knee replacement are reported.\par \par Review of Systems-\par Constitutional: No fever or chills. \par Cardiovascular: No orthopnea or chest pain\par Pulmonary: No shortness of breath. \par GI: No nausea or vomiting or abdominal pain.\par Musculoskeletal: see HPI \par Psychiatric: No anxiety and depression.

## 2022-07-09 NOTE — DISCUSSION/SUMMARY
[Surgical risks reviewed] : Surgical risks reviewed [de-identified] : More than 30 minutes was spent reviewing the x-rays as well as discussing with the patient their clinical presentation, diagnosis and providing education.  The patient does have advanced osteoarthritis of the left knee.  He reports of increasing knee pain.  At this time he has failed conservative treatment options.  He will look to schedule a left knee replacement in the next 1 to 2 months.  He will require medical and cardiac clearance.\par \par The patient is a 66 year old individual with end stage arthritis of their left knee joint. Based upon the patient's continued symptoms and failure to respond to conservative treatment I have recommended a left total knee arthroplasty for this patient. A long discussion took place with the patient describing what a total joint replacement is and what the procedure would entail. A knee model, similar to the implant that will be used during the operation, was utilized to demonstrate and to discuss the various bearing surfaces of the implants. The hospitalization and post-operative care and rehabilitation were also discussed. The use of perioperative antibiotics and DVT prophylaxis were discussed. The risk, benefits and alternatives to a surgical intervention were discussed at length with the patient. The patient was also advised of risks related to the medical comorbidities and elevated body mass index (BMI).  A lengthy discussion took place to review the most common complications including but not limited to: deep vein thrombosis, pulmonary embolus, heart attack, stroke, infection, wound breakdown, numbness, damage to nerves, tendon, muscles, arteries or other blood vessels, death and other possible complications from anesthesia. The patient was told that we will take steps to minimize these risks by using sterile technique, antibiotics and DVT prophylaxis when appropriate and follow the patient postoperatively in the office setting to monitor progress. The possibility of recurrent pain, no improvement in pain and actual worsening of pain were also discussed with the patient.\par The discharge plan of care focused on the patient going home following surgery.  The patient was encouraged to make the necessary arrangements to have someone stay with them when they are discharged home.  Following discharge, a home care nurse will visit the patient.  The home care nurse will open your home care case and request home physical therapy services.  Home physical therapy will commence following discharge provided it is appropriate and covered by the health insurance benefit plan. \par The benefits of surgery were discussed with the patient including the potential for improving his/her current clinical condition through operative intervention. Alternatives to surgical intervention including continued conservative management were also discussed in detail. All questions were answered to the satisfaction of the patient. The treatment plan of care, as well as a model of a total knee equivalent to the one that will be used for their total joint replacement, was shared with the patient.  The patient agreed to the plan of care as well as the use of implants in their total joint replacement.\par

## 2022-07-09 NOTE — PHYSICAL EXAM
[de-identified] : The patient appears well nourished and in no apparent distress. The patient is alert and oriented to person, place, and time. Affect and mood appear normal. The head is normocephalic and atraumatic. The eyes reveal normal sclera and extra ocular muscles are intact. The mucous membranes are moist. Skin shows normal turgor with no evidence of eczema or psoriasis. No respiratory distress noted. MUSCULOSKELETAL / NEURO / VASCULAR:   SEE BELOW - \par   \par Neuro:\par Sensation: intact to fine & deep touch bilat. \par Motor function: Intact\par \par Vascular: \par DP: 2+\par Cap refill 1-2 sec. all toes\par Skin(LE): No cellulitis, edema, and min. venous varicosities bilaterally.  No ulcerations.  No active cellulitis.\par  \par Left knee:\par Swelling: Moderate\par Effusion: Moderate\par Alignment: -3 degrees varus\par Exensor Mechanism Lag: No\par Flexion contracture: No\par Tenderness: Medial\par Incision: None\par Skin Temp: Normal\par ROM: Flexion: 110 deg.; Extension: 0 deg,\par Laxity: A/P no, M/L little\par PF crepitus: No; no pain\par Quadricep formation: Intact in Extension & Flexion:\par Quad/Ham St: 4+/5\par  [de-identified] : Three-view left knee x-rays were reviewed.  Severe joint space narrowing of the medial compartment.  Osteophyte formation of the patellofemoral and medial compartments.  There is advanced joint space narrowing of the patellofemoral joint.

## 2022-07-09 NOTE — END OF VISIT
[FreeTextEntry3] : I, Dr. Bustamante, personally performed the evaluation and management services for this established patient who presented today with a new problem/exacerbation of an existing condition. That E/M includes conducting the examination, assessing all new/exacerbated conditions, and establishing a new plan of care. Today, MY ACP was here to observe my evaluation and management services of this new problem/exacerbated condition to be followed going forward.\par

## 2022-07-18 ENCOUNTER — OUTPATIENT (OUTPATIENT)
Dept: OUTPATIENT SERVICES | Facility: HOSPITAL | Age: 67
LOS: 1 days | End: 2022-07-18
Payer: MEDICARE

## 2022-07-18 VITALS
DIASTOLIC BLOOD PRESSURE: 86 MMHG | WEIGHT: 270.07 LBS | TEMPERATURE: 97 F | HEIGHT: 70 IN | SYSTOLIC BLOOD PRESSURE: 134 MMHG | RESPIRATION RATE: 16 BRPM | HEART RATE: 56 BPM | OXYGEN SATURATION: 97 %

## 2022-07-18 DIAGNOSIS — M17.12 UNILATERAL PRIMARY OSTEOARTHRITIS, LEFT KNEE: ICD-10-CM

## 2022-07-18 DIAGNOSIS — Z96.651 PRESENCE OF RIGHT ARTIFICIAL KNEE JOINT: Chronic | ICD-10-CM

## 2022-07-18 DIAGNOSIS — Z98.890 OTHER SPECIFIED POSTPROCEDURAL STATES: Chronic | ICD-10-CM

## 2022-07-18 DIAGNOSIS — Z90.49 ACQUIRED ABSENCE OF OTHER SPECIFIED PARTS OF DIGESTIVE TRACT: Chronic | ICD-10-CM

## 2022-07-18 DIAGNOSIS — Z01.818 ENCOUNTER FOR OTHER PREPROCEDURAL EXAMINATION: ICD-10-CM

## 2022-07-18 DIAGNOSIS — D17.79 BENIGN LIPOMATOUS NEOPLASM OF OTHER SITES: Chronic | ICD-10-CM

## 2022-07-18 DIAGNOSIS — Z98.49 CATARACT EXTRACTION STATUS, UNSPECIFIED EYE: Chronic | ICD-10-CM

## 2022-07-18 LAB
A1C WITH ESTIMATED AVERAGE GLUCOSE RESULT: 5.8 % — HIGH (ref 4–5.6)
ALBUMIN SERPL ELPH-MCNC: 3.6 G/DL — SIGNIFICANT CHANGE UP (ref 3.3–5)
ALP SERPL-CCNC: 85 U/L — SIGNIFICANT CHANGE UP (ref 30–120)
ALT FLD-CCNC: 22 U/L DA — SIGNIFICANT CHANGE UP (ref 10–60)
ANION GAP SERPL CALC-SCNC: 10 MMOL/L — SIGNIFICANT CHANGE UP (ref 5–17)
APTT BLD: 32.9 SEC — SIGNIFICANT CHANGE UP (ref 27.5–35.5)
AST SERPL-CCNC: 12 U/L — SIGNIFICANT CHANGE UP (ref 10–40)
BILIRUB SERPL-MCNC: 0.4 MG/DL — SIGNIFICANT CHANGE UP (ref 0.2–1.2)
BUN SERPL-MCNC: 16 MG/DL — SIGNIFICANT CHANGE UP (ref 7–23)
CALCIUM SERPL-MCNC: 9.3 MG/DL — SIGNIFICANT CHANGE UP (ref 8.4–10.5)
CHLORIDE SERPL-SCNC: 106 MMOL/L — SIGNIFICANT CHANGE UP (ref 96–108)
CO2 SERPL-SCNC: 23 MMOL/L — SIGNIFICANT CHANGE UP (ref 22–31)
CREAT SERPL-MCNC: 1.04 MG/DL — SIGNIFICANT CHANGE UP (ref 0.5–1.3)
EGFR: 80 ML/MIN/1.73M2 — SIGNIFICANT CHANGE UP
ESTIMATED AVERAGE GLUCOSE: 120 MG/DL — HIGH (ref 68–114)
GLUCOSE SERPL-MCNC: 113 MG/DL — HIGH (ref 70–99)
HCT VFR BLD CALC: 47.1 % — SIGNIFICANT CHANGE UP (ref 39–50)
HGB BLD-MCNC: 15.5 G/DL — SIGNIFICANT CHANGE UP (ref 13–17)
INR BLD: 1.03 RATIO — SIGNIFICANT CHANGE UP (ref 0.88–1.16)
MCHC RBC-ENTMCNC: 29.2 PG — SIGNIFICANT CHANGE UP (ref 27–34)
MCHC RBC-ENTMCNC: 32.9 GM/DL — SIGNIFICANT CHANGE UP (ref 32–36)
MCV RBC AUTO: 88.7 FL — SIGNIFICANT CHANGE UP (ref 80–100)
MRSA PCR RESULT.: SIGNIFICANT CHANGE UP
NRBC # BLD: 0 /100 WBCS — SIGNIFICANT CHANGE UP (ref 0–0)
PLATELET # BLD AUTO: 225 K/UL — SIGNIFICANT CHANGE UP (ref 150–400)
POTASSIUM SERPL-MCNC: 4 MMOL/L — SIGNIFICANT CHANGE UP (ref 3.5–5.3)
POTASSIUM SERPL-SCNC: 4 MMOL/L — SIGNIFICANT CHANGE UP (ref 3.5–5.3)
PROT SERPL-MCNC: 7.2 G/DL — SIGNIFICANT CHANGE UP (ref 6–8.3)
PROTHROM AB SERPL-ACNC: 12.1 SEC — SIGNIFICANT CHANGE UP (ref 10.5–13.4)
RBC # BLD: 5.31 M/UL — SIGNIFICANT CHANGE UP (ref 4.2–5.8)
RBC # FLD: 12.7 % — SIGNIFICANT CHANGE UP (ref 10.3–14.5)
S AUREUS DNA NOSE QL NAA+PROBE: SIGNIFICANT CHANGE UP
SODIUM SERPL-SCNC: 139 MMOL/L — SIGNIFICANT CHANGE UP (ref 135–145)
WBC # BLD: 8.51 K/UL — SIGNIFICANT CHANGE UP (ref 3.8–10.5)
WBC # FLD AUTO: 8.51 K/UL — SIGNIFICANT CHANGE UP (ref 3.8–10.5)

## 2022-07-18 PROCEDURE — G0463: CPT

## 2022-07-18 PROCEDURE — 85610 PROTHROMBIN TIME: CPT

## 2022-07-18 PROCEDURE — 85027 COMPLETE CBC AUTOMATED: CPT

## 2022-07-18 PROCEDURE — 36415 COLL VENOUS BLD VENIPUNCTURE: CPT

## 2022-07-18 PROCEDURE — 80053 COMPREHEN METABOLIC PANEL: CPT

## 2022-07-18 PROCEDURE — 85730 THROMBOPLASTIN TIME PARTIAL: CPT

## 2022-07-18 PROCEDURE — 87641 MR-STAPH DNA AMP PROBE: CPT

## 2022-07-18 PROCEDURE — 83036 HEMOGLOBIN GLYCOSYLATED A1C: CPT

## 2022-07-18 PROCEDURE — 87640 STAPH A DNA AMP PROBE: CPT

## 2022-07-18 NOTE — H&P PST ADULT - HISTORY OF PRESENT ILLNESS
65 yo male reports over 5 year history of left knee pain.  Patient states that he has received cortisone injections in the past along with gel injections with no relief.  Pain level 8/10 with activity and 3/10 at rest.  Currently wearing knee brace for support.  No current analgesics.

## 2022-07-18 NOTE — H&P PST ADULT - VENOUS THROMBOEMBOLISM FOR WOMEN ONLY
Transcranial doppler exam #1 (11/8/21) 11am  mean velocity  cm/sec                              Left         Right  RAYNA                    91            77  MCA                   91           100   PCA                   50,37        38,45    VERT                   45             x  BA                              49  Official report to follow.  John    Transcranial doppler exam #1 (11/12/21) 11am  mean velocity  cm/sec                              Left         Right  RAYNA                    82            59  MCA                   61            72   PCA                   P2-31        31,32   Official report to follow.  John (0) indicator not present

## 2022-07-18 NOTE — H&P PST ADULT - NSICDXPASTSURGICALHX_GEN_ALL_CORE_FT
PAST SURGICAL HISTORY:  H/O arthroscopy of left knee 2018    H/O cataract extraction bilateral 2019    H/O inguinal hernia repair bilateral with umbilical hernia repair 2001    History of arthroplasty of right knee 3/2022    Lipoma of hand 2017    S/P laparoscopic cholecystectomy 2014

## 2022-08-02 ENCOUNTER — OUTPATIENT (OUTPATIENT)
Dept: OUTPATIENT SERVICES | Facility: HOSPITAL | Age: 67
LOS: 1 days | End: 2022-08-02
Payer: MEDICARE

## 2022-08-02 DIAGNOSIS — Z98.890 OTHER SPECIFIED POSTPROCEDURAL STATES: Chronic | ICD-10-CM

## 2022-08-02 DIAGNOSIS — Z20.828 CONTACT WITH AND (SUSPECTED) EXPOSURE TO OTHER VIRAL COMMUNICABLE DISEASES: ICD-10-CM

## 2022-08-02 DIAGNOSIS — Z90.49 ACQUIRED ABSENCE OF OTHER SPECIFIED PARTS OF DIGESTIVE TRACT: Chronic | ICD-10-CM

## 2022-08-02 DIAGNOSIS — D17.79 BENIGN LIPOMATOUS NEOPLASM OF OTHER SITES: Chronic | ICD-10-CM

## 2022-08-02 DIAGNOSIS — Z98.49 CATARACT EXTRACTION STATUS, UNSPECIFIED EYE: Chronic | ICD-10-CM

## 2022-08-02 DIAGNOSIS — Z96.651 PRESENCE OF RIGHT ARTIFICIAL KNEE JOINT: Chronic | ICD-10-CM

## 2022-08-02 PROBLEM — M17.12 UNILATERAL PRIMARY OSTEOARTHRITIS, LEFT KNEE: Chronic | Status: ACTIVE | Noted: 2022-07-18

## 2022-08-02 LAB — SARS-COV-2 RNA SPEC QL NAA+PROBE: SIGNIFICANT CHANGE UP

## 2022-08-02 PROCEDURE — U0005: CPT

## 2022-08-02 PROCEDURE — U0003: CPT

## 2022-08-03 ENCOUNTER — FORM ENCOUNTER (OUTPATIENT)
Age: 67
End: 2022-08-03

## 2022-08-03 NOTE — PATIENT PROFILE ADULT - NSPREOP1_SURGERYTIME_GEN_A_NUR
10:00 [id number] x3)    ATTENDING SURGEON: FABIÁN Martinez  Interventional Pain Management    ASSISTANT: none    COMPLICATIONS: None.     IMPLANTS:  None    ESTIMATED BLOOD LOSS:  < 5cc    SPECIMENS REMOVED:  None    ANESTHESIA: Local lidocaine    INDICATIONS FOR PROCEDURE: This is a 72 y.o. female with longstanding knee pain. They have failed non operative management including injections.I discussed a new treatment therapy called Iovera, which is cryotherapy, to provide symptomatic relief along the sensory distribution of the infrapatellar tendon branch of the saphenous nerve  and AFCN. The patient elected to move forward with this  We did discuss the fact that this is a fairly novel procedure and there is very limited scientific data around this.  However, it FDA approved.  The patient was given patient information and literature to review prior to the procedure as well.  Based on this, the patient agreed to move forward with doing the procedure.      PROCEDURE:    The patient was placed supine on the exam table and the proximal medial aspect of the right tibia and anterior aspect of distal femur was prepped with sterile Betadine and alcohol.  A line was drawn extending approximately 5 cm medial to inferior pole of the patella distally to a point approximately 5 cm medial to the tibial tubercle.  A second line was drawn in a medial to lateral direction the width of the patella approximately 7 cm proximal to the patella. We then infiltrated the skin with lidocaine along both lines using a 25g needle. We then introduced the Iovera device along these lines and this device penetrated  the skin, creating cryotherapy to both branches of the infrapatellar saphenous nerve and a third treatment to the anterior femoral cutaneous nerve. 7 punctures of the skin were made to treat the 2 branches of the ISN and another 7 punctures were made to treat the AFCN. There were a total of 3 nerves treated with iovera. The patient tolerated the procedure well with no problems.

## 2022-08-03 NOTE — PATIENT PROFILE ADULT - FALL HARM RISK - UNIVERSAL INTERVENTIONS
Bed in lowest position, wheels locked, appropriate side rails in place/Call bell, personal items and telephone in reach/Instruct patient to call for assistance before getting out of bed or chair/Non-slip footwear when patient is out of bed/[place] to call system/Physically safe environment - no spills, clutter or unnecessary equipment/Purposeful Proactive Rounding/Room/bathroom lighting operational, light cord in reach

## 2022-08-03 NOTE — PATIENT PROFILE ADULT - NSPROPOAPRESSUREINJURY_GEN_A_NUR
Psychiatric Followup    Current symptoms/changes from last visit: patient reports ongoing stability in past weeks with current combinaton, denies any further issues of paranoia or fears of safety or thoughts of harm from parents, improved sleep nightly with trazodone. Continues going to gym regularly though some issues with boredom during day with current isolation, does try to learn various subjects daily. getting along well with parents  Denies EPS issues, denies voices or thoughts of self harm.     Prior trials zyprexa, Haldol, propranolol  Prior sleep trazodone, doxepin    Current Mental Status: cooperative, soft speech, improved mood, less blunted affect thoughts appear logical, goal directed, denes paranoia, denies AVH, stable judgment, fair insight    Adverse side effects: denies  Laboratory results:     Assessment:  22 year old male with recent onset of AVH, paranoia, thought broadcasting does meet criteria for schizophrenia.  Plan:   continue latuda 40mg qhs with meals  continue loxapine 10mg QHS for sleep, ongoing paranoia  patient requires 2 antipsychotics for ongoing stability  may consider vralar trial  continue Wellbutrin XL 300mg daily for fatigue, motivation  continue trazodone 50mg for sleep  Continue FEP treatment  Monitor weight   Therapeutic/Supportive intervention(s):     Next Appointment:  2-3 months  
no

## 2022-08-04 ENCOUNTER — TRANSCRIPTION ENCOUNTER (OUTPATIENT)
Age: 67
End: 2022-08-04

## 2022-08-04 ENCOUNTER — APPOINTMENT (OUTPATIENT)
Dept: ORTHOPEDIC SURGERY | Facility: HOSPITAL | Age: 67
End: 2022-08-04

## 2022-08-04 ENCOUNTER — RESULT REVIEW (OUTPATIENT)
Age: 67
End: 2022-08-04

## 2022-08-04 ENCOUNTER — INPATIENT (INPATIENT)
Facility: HOSPITAL | Age: 67
LOS: 0 days | Discharge: HOME | DRG: 470 | End: 2022-08-05
Attending: ORTHOPAEDIC SURGERY | Admitting: ORTHOPAEDIC SURGERY
Payer: MEDICARE

## 2022-08-04 VITALS
TEMPERATURE: 98 F | WEIGHT: 274.92 LBS | SYSTOLIC BLOOD PRESSURE: 142 MMHG | HEIGHT: 70 IN | RESPIRATION RATE: 16 BRPM | DIASTOLIC BLOOD PRESSURE: 72 MMHG | OXYGEN SATURATION: 100 % | HEART RATE: 61 BPM

## 2022-08-04 DIAGNOSIS — D17.79 BENIGN LIPOMATOUS NEOPLASM OF OTHER SITES: Chronic | ICD-10-CM

## 2022-08-04 DIAGNOSIS — Z90.49 ACQUIRED ABSENCE OF OTHER SPECIFIED PARTS OF DIGESTIVE TRACT: Chronic | ICD-10-CM

## 2022-08-04 DIAGNOSIS — Z98.890 OTHER SPECIFIED POSTPROCEDURAL STATES: Chronic | ICD-10-CM

## 2022-08-04 DIAGNOSIS — M17.12 UNILATERAL PRIMARY OSTEOARTHRITIS, LEFT KNEE: ICD-10-CM

## 2022-08-04 DIAGNOSIS — Z96.651 PRESENCE OF RIGHT ARTIFICIAL KNEE JOINT: Chronic | ICD-10-CM

## 2022-08-04 DIAGNOSIS — Z98.49 CATARACT EXTRACTION STATUS, UNSPECIFIED EYE: Chronic | ICD-10-CM

## 2022-08-04 LAB
ALBUMIN SERPL ELPH-MCNC: 3.2 G/DL — LOW (ref 3.3–5)
ALP SERPL-CCNC: 83 U/L — SIGNIFICANT CHANGE UP (ref 30–120)
ALT FLD-CCNC: 21 U/L DA — SIGNIFICANT CHANGE UP (ref 10–60)
ANION GAP SERPL CALC-SCNC: 8 MMOL/L — SIGNIFICANT CHANGE UP (ref 5–17)
AST SERPL-CCNC: 15 U/L — SIGNIFICANT CHANGE UP (ref 10–40)
BILIRUB SERPL-MCNC: 0.5 MG/DL — SIGNIFICANT CHANGE UP (ref 0.2–1.2)
BUN SERPL-MCNC: 18 MG/DL — SIGNIFICANT CHANGE UP (ref 7–23)
CALCIUM SERPL-MCNC: 8.9 MG/DL — SIGNIFICANT CHANGE UP (ref 8.4–10.5)
CHLORIDE SERPL-SCNC: 103 MMOL/L — SIGNIFICANT CHANGE UP (ref 96–108)
CO2 SERPL-SCNC: 25 MMOL/L — SIGNIFICANT CHANGE UP (ref 22–31)
CREAT SERPL-MCNC: 1.13 MG/DL — SIGNIFICANT CHANGE UP (ref 0.5–1.3)
EGFR: 72 ML/MIN/1.73M2 — SIGNIFICANT CHANGE UP
GLUCOSE SERPL-MCNC: 196 MG/DL — HIGH (ref 70–99)
HCT VFR BLD CALC: 43.9 % — SIGNIFICANT CHANGE UP (ref 39–50)
HGB BLD-MCNC: 14.3 G/DL — SIGNIFICANT CHANGE UP (ref 13–17)
MCHC RBC-ENTMCNC: 29.2 PG — SIGNIFICANT CHANGE UP (ref 27–34)
MCHC RBC-ENTMCNC: 32.6 GM/DL — SIGNIFICANT CHANGE UP (ref 32–36)
MCV RBC AUTO: 89.6 FL — SIGNIFICANT CHANGE UP (ref 80–100)
NRBC # BLD: 0 /100 WBCS — SIGNIFICANT CHANGE UP (ref 0–0)
PLATELET # BLD AUTO: 227 K/UL — SIGNIFICANT CHANGE UP (ref 150–400)
POTASSIUM SERPL-MCNC: 3.7 MMOL/L — SIGNIFICANT CHANGE UP (ref 3.5–5.3)
POTASSIUM SERPL-SCNC: 3.7 MMOL/L — SIGNIFICANT CHANGE UP (ref 3.5–5.3)
PROT SERPL-MCNC: 6.6 G/DL — SIGNIFICANT CHANGE UP (ref 6–8.3)
RBC # BLD: 4.9 M/UL — SIGNIFICANT CHANGE UP (ref 4.2–5.8)
RBC # FLD: 12.8 % — SIGNIFICANT CHANGE UP (ref 10.3–14.5)
SODIUM SERPL-SCNC: 136 MMOL/L — SIGNIFICANT CHANGE UP (ref 135–145)
WBC # BLD: 14.74 K/UL — HIGH (ref 3.8–10.5)
WBC # FLD AUTO: 14.74 K/UL — HIGH (ref 3.8–10.5)

## 2022-08-04 PROCEDURE — 73562 X-RAY EXAM OF KNEE 3: CPT | Mod: 26,LT

## 2022-08-04 PROCEDURE — 99222 1ST HOSP IP/OBS MODERATE 55: CPT

## 2022-08-04 PROCEDURE — 88305 TISSUE EXAM BY PATHOLOGIST: CPT | Mod: 26

## 2022-08-04 PROCEDURE — 88311 DECALCIFY TISSUE: CPT | Mod: 26

## 2022-08-04 PROCEDURE — 27447 TOTAL KNEE ARTHROPLASTY: CPT | Mod: LT

## 2022-08-04 DEVICE — INSERT TIB NONPOROUS UNIV SZ 9 LT: Type: IMPLANTABLE DEVICE | Site: LEFT | Status: FUNCTIONAL

## 2022-08-04 DEVICE — BONE WAX 2.5GM: Type: IMPLANTABLE DEVICE | Site: LEFT | Status: FUNCTIONAL

## 2022-08-04 DEVICE — COMP PATELLA TRI-PEG E-PLUS POLY 9X35MM: Type: IMPLANTABLE DEVICE | Site: LEFT | Status: FUNCTIONAL

## 2022-08-04 DEVICE — CEMENT BONE COBALT G-HV: Type: IMPLANTABLE DEVICE | Site: LEFT | Status: FUNCTIONAL

## 2022-08-04 DEVICE — CEMENT BONE PALACOS PRO HIGH VISCOSITY 80G W GENTAMICIN: Type: IMPLANTABLE DEVICE | Site: LEFT | Status: FUNCTIONAL

## 2022-08-04 DEVICE — COMP FEM NON POROUS SZ 8 LT: Type: IMPLANTABLE DEVICE | Site: LEFT | Status: FUNCTIONAL

## 2022-08-04 DEVICE — IMPLANTABLE DEVICE: Type: IMPLANTABLE DEVICE | Site: LEFT | Status: FUNCTIONAL

## 2022-08-04 DEVICE — PIN THREADED HEADED STRL: Type: IMPLANTABLE DEVICE | Site: LEFT | Status: FUNCTIONAL

## 2022-08-04 RX ORDER — ACETAMINOPHEN 500 MG
1000 TABLET ORAL ONCE
Refills: 0 | Status: COMPLETED | OUTPATIENT
Start: 2022-08-04 | End: 2022-08-04

## 2022-08-04 RX ORDER — AMLODIPINE BESYLATE 2.5 MG/1
5 TABLET ORAL DAILY
Refills: 0 | Status: DISCONTINUED | OUTPATIENT
Start: 2022-08-06 | End: 2022-08-05

## 2022-08-04 RX ORDER — ASPIRIN/CALCIUM CARB/MAGNESIUM 324 MG
81 TABLET ORAL EVERY 12 HOURS
Refills: 0 | Status: DISCONTINUED | OUTPATIENT
Start: 2022-08-05 | End: 2022-08-05

## 2022-08-04 RX ORDER — SODIUM CHLORIDE 9 MG/ML
1000 INJECTION, SOLUTION INTRAVENOUS
Refills: 0 | Status: DISCONTINUED | OUTPATIENT
Start: 2022-08-04 | End: 2022-08-05

## 2022-08-04 RX ORDER — SODIUM CHLORIDE 9 MG/ML
500 INJECTION INTRAMUSCULAR; INTRAVENOUS; SUBCUTANEOUS ONCE
Refills: 0 | Status: COMPLETED | OUTPATIENT
Start: 2022-08-04 | End: 2022-08-04

## 2022-08-04 RX ORDER — CELECOXIB 200 MG/1
200 CAPSULE ORAL EVERY 12 HOURS
Refills: 0 | Status: DISCONTINUED | OUTPATIENT
Start: 2022-08-05 | End: 2022-08-05

## 2022-08-04 RX ORDER — OXYCODONE HYDROCHLORIDE 5 MG/1
5 TABLET ORAL
Refills: 0 | Status: DISCONTINUED | OUTPATIENT
Start: 2022-08-04 | End: 2022-08-05

## 2022-08-04 RX ORDER — TRANEXAMIC ACID 100 MG/ML
1000 INJECTION, SOLUTION INTRAVENOUS ONCE
Refills: 0 | Status: COMPLETED | OUTPATIENT
Start: 2022-08-04 | End: 2022-08-04

## 2022-08-04 RX ORDER — OXYCODONE HYDROCHLORIDE 5 MG/1
10 TABLET ORAL
Refills: 0 | Status: DISCONTINUED | OUTPATIENT
Start: 2022-08-04 | End: 2022-08-05

## 2022-08-04 RX ORDER — CEFAZOLIN SODIUM 1 G
3000 VIAL (EA) INJECTION EVERY 8 HOURS
Refills: 0 | Status: COMPLETED | OUTPATIENT
Start: 2022-08-04 | End: 2022-08-05

## 2022-08-04 RX ORDER — CELECOXIB 200 MG/1
1 CAPSULE ORAL
Qty: 60 | Refills: 0
Start: 2022-08-04 | End: 2022-09-02

## 2022-08-04 RX ORDER — HYDROMORPHONE HYDROCHLORIDE 2 MG/ML
0.5 INJECTION INTRAMUSCULAR; INTRAVENOUS; SUBCUTANEOUS
Refills: 0 | Status: DISCONTINUED | OUTPATIENT
Start: 2022-08-04 | End: 2022-08-04

## 2022-08-04 RX ORDER — CEFAZOLIN SODIUM 1 G
3000 VIAL (EA) INJECTION ONCE
Refills: 0 | Status: COMPLETED | OUTPATIENT
Start: 2022-08-04 | End: 2022-08-04

## 2022-08-04 RX ORDER — ONDANSETRON 8 MG/1
4 TABLET, FILM COATED ORAL EVERY 6 HOURS
Refills: 0 | Status: DISCONTINUED | OUTPATIENT
Start: 2022-08-04 | End: 2022-08-05

## 2022-08-04 RX ORDER — APREPITANT 80 MG/1
40 CAPSULE ORAL ONCE
Refills: 0 | Status: COMPLETED | OUTPATIENT
Start: 2022-08-04 | End: 2022-08-04

## 2022-08-04 RX ORDER — SODIUM CHLORIDE 9 MG/ML
1000 INJECTION, SOLUTION INTRAVENOUS
Refills: 0 | Status: DISCONTINUED | OUTPATIENT
Start: 2022-08-04 | End: 2022-08-04

## 2022-08-04 RX ORDER — HYDROMORPHONE HYDROCHLORIDE 2 MG/ML
0.5 INJECTION INTRAMUSCULAR; INTRAVENOUS; SUBCUTANEOUS ONCE
Refills: 0 | Status: DISCONTINUED | OUTPATIENT
Start: 2022-08-04 | End: 2022-08-05

## 2022-08-04 RX ORDER — PANTOPRAZOLE SODIUM 20 MG/1
40 TABLET, DELAYED RELEASE ORAL
Refills: 0 | Status: DISCONTINUED | OUTPATIENT
Start: 2022-08-04 | End: 2022-08-05

## 2022-08-04 RX ORDER — POLYETHYLENE GLYCOL 3350 17 G/17G
17 POWDER, FOR SOLUTION ORAL AT BEDTIME
Refills: 0 | Status: DISCONTINUED | OUTPATIENT
Start: 2022-08-04 | End: 2022-08-05

## 2022-08-04 RX ORDER — ACETAMINOPHEN 500 MG
1000 TABLET ORAL ONCE
Refills: 0 | Status: COMPLETED | OUTPATIENT
Start: 2022-08-05 | End: 2022-08-04

## 2022-08-04 RX ORDER — SENNA PLUS 8.6 MG/1
2 TABLET ORAL AT BEDTIME
Refills: 0 | Status: DISCONTINUED | OUTPATIENT
Start: 2022-08-04 | End: 2022-08-05

## 2022-08-04 RX ORDER — ASPIRIN/CALCIUM CARB/MAGNESIUM 324 MG
1 TABLET ORAL
Qty: 60 | Refills: 0
Start: 2022-08-04 | End: 2022-09-02

## 2022-08-04 RX ORDER — ONDANSETRON 8 MG/1
4 TABLET, FILM COATED ORAL ONCE
Refills: 0 | Status: DISCONTINUED | OUTPATIENT
Start: 2022-08-04 | End: 2022-08-04

## 2022-08-04 RX ORDER — MAGNESIUM HYDROXIDE 400 MG/1
30 TABLET, CHEWABLE ORAL DAILY
Refills: 0 | Status: DISCONTINUED | OUTPATIENT
Start: 2022-08-04 | End: 2022-08-05

## 2022-08-04 RX ORDER — ACETAMINOPHEN 500 MG
1000 TABLET ORAL EVERY 8 HOURS
Refills: 0 | Status: DISCONTINUED | OUTPATIENT
Start: 2022-08-05 | End: 2022-08-05

## 2022-08-04 RX ORDER — DEXAMETHASONE 0.5 MG/5ML
8 ELIXIR ORAL ONCE
Refills: 0 | Status: COMPLETED | OUTPATIENT
Start: 2022-08-05 | End: 2022-08-05

## 2022-08-04 RX ORDER — CHLORHEXIDINE GLUCONATE 213 G/1000ML
1 SOLUTION TOPICAL ONCE
Refills: 0 | Status: COMPLETED | OUTPATIENT
Start: 2022-08-04 | End: 2022-08-04

## 2022-08-04 RX ORDER — OMEPRAZOLE 10 MG/1
1 CAPSULE, DELAYED RELEASE ORAL
Qty: 30 | Refills: 1
Start: 2022-08-04 | End: 2022-10-02

## 2022-08-04 RX ORDER — CEFAZOLIN SODIUM 1 G
2000 VIAL (EA) INJECTION ONCE
Refills: 0 | Status: DISCONTINUED | OUTPATIENT
Start: 2022-08-04 | End: 2022-08-04

## 2022-08-04 RX ORDER — OXYCODONE HYDROCHLORIDE 5 MG/1
5 TABLET ORAL ONCE
Refills: 0 | Status: DISCONTINUED | OUTPATIENT
Start: 2022-08-04 | End: 2022-08-04

## 2022-08-04 RX ADMIN — SODIUM CHLORIDE 75 MILLILITER(S): 9 INJECTION, SOLUTION INTRAVENOUS at 14:28

## 2022-08-04 RX ADMIN — SODIUM CHLORIDE 75 MILLILITER(S): 9 INJECTION, SOLUTION INTRAVENOUS at 17:58

## 2022-08-04 RX ADMIN — POLYETHYLENE GLYCOL 3350 17 GRAM(S): 17 POWDER, FOR SOLUTION ORAL at 21:37

## 2022-08-04 RX ADMIN — APREPITANT 40 MILLIGRAM(S): 80 CAPSULE ORAL at 10:05

## 2022-08-04 RX ADMIN — CHLORHEXIDINE GLUCONATE 1 APPLICATION(S): 213 SOLUTION TOPICAL at 10:05

## 2022-08-04 RX ADMIN — Medication 400 MILLIGRAM(S): at 17:57

## 2022-08-04 RX ADMIN — Medication 200 MILLIGRAM(S): at 20:14

## 2022-08-04 RX ADMIN — Medication 1000 MILLIGRAM(S): at 18:30

## 2022-08-04 RX ADMIN — SODIUM CHLORIDE 500 MILLILITER(S): 9 INJECTION INTRAMUSCULAR; INTRAVENOUS; SUBCUTANEOUS at 15:19

## 2022-08-04 RX ADMIN — SENNA PLUS 2 TABLET(S): 8.6 TABLET ORAL at 21:38

## 2022-08-04 RX ADMIN — SODIUM CHLORIDE 500 MILLILITER(S): 9 INJECTION INTRAMUSCULAR; INTRAVENOUS; SUBCUTANEOUS at 19:04

## 2022-08-04 RX ADMIN — Medication 400 MILLIGRAM(S): at 23:52

## 2022-08-04 NOTE — CONSULT NOTE ADULT - TIME BILLING
Encounter Date: 6/20/2018       History     Chief Complaint   Patient presents with    Hematemesis     reports fever and cough x 6 days and began coughing up dark red blood/ yellow drainage yesterday.     Ms Apple is a 53YOWF who presents for congestion, headache, productive cough x 2 weeks, pertinent PMHx asthma with TID daily inhaler use. Patient was hospitalized for asthma exacerbation in 2007 with no sequela. She reports onset of viral URI-like symptoms of sinus congestion, sore throat and headache 2-3 weeks prior. Productive cough then developed several days later. She endorses pleuritic chest pain with coughing. Sputum is mostly clear/white but is streaked with small amount of dark brown material. Patient does endorse nose bleeds from mechanical irritation, unclear if sputum is post-nasal drip. She endorses intermittent chills and headache which was alleviated with Advil. She denies sick contacts, recent travel, wheezing, confusion, LOC, abdominal pain, nausea, CP, SOB. Of note, patient was referred to ED by PCP for concerns of PNA. No prior Hx of PNA. Patient denies tobacco abuse, alcohol abuse.           Review of patient's allergies indicates:   Allergen Reactions    Compazine [prochlorperazine edisylate]     Phenergan plain      Past Medical History:   Diagnosis Date    Asthma     Broken femur     Hashimoto's disease     Pulmonary embolism      Past Surgical History:   Procedure Laterality Date    EYE SURGERY      FRACTURE SURGERY      HYSTERECTOMY      KNEE SURGERY       History reviewed. No pertinent family history.  Social History   Substance Use Topics    Smoking status: Former Smoker     Types: Cigarettes    Smokeless tobacco: Never Used    Alcohol use No     Review of Systems   Constitutional: Positive for chills and fatigue. Negative for appetite change, diaphoresis and fever.   HENT: Positive for congestion, nosebleeds, postnasal drip, rhinorrhea, sinus pain, sinus pressure and sore  throat. Negative for drooling, ear discharge, ear pain, facial swelling, hearing loss, mouth sores, sneezing and tinnitus.    Eyes: Negative for photophobia and visual disturbance.   Respiratory: Positive for cough. Negative for chest tightness, shortness of breath and wheezing.    Cardiovascular: Negative for chest pain, palpitations and leg swelling.   Gastrointestinal: Negative for abdominal pain, constipation, diarrhea, nausea and vomiting.   Genitourinary: Negative for decreased urine volume, dysuria, flank pain, frequency, hematuria, pelvic pain and urgency.   Musculoskeletal: Negative for arthralgias, back pain, myalgias, neck pain and neck stiffness.   Skin: Negative for pallor and rash.   Neurological: Positive for headaches. Negative for dizziness, syncope, speech difficulty, weakness, light-headedness and numbness.   Psychiatric/Behavioral: Negative for agitation, confusion and decreased concentration. The patient is not nervous/anxious.        Physical Exam     Initial Vitals [06/20/18 1348]   BP Pulse Resp Temp SpO2   (!) 126/58 84 18 98.8 °F (37.1 °C) 95 %      MAP       --         Physical Exam    Vitals reviewed.  Constitutional: She appears well-developed and well-nourished. She is not diaphoretic. No distress.   Non-toxic but ill appearing patient in NAD, VSS, afebrile, 96% on RA.   HENT:   Head: Normocephalic and atraumatic.   Right Ear: External ear normal.   Left Ear: External ear normal.   Oropharynx is erythematous with mild exudate present. Maxillary and frontal sinuses TTP. Enlarged and erythematous nasal turbinates B/L. TMs well visualized with no surrounding erythema, effusion nor hemotympanum.    Eyes: Conjunctivae and EOM are normal. Pupils are equal, round, and reactive to light. No scleral icterus.   Neck: Normal range of motion. Neck supple.   Cardiovascular: Normal rate, regular rhythm, normal heart sounds and intact distal pulses.   No murmur heard.  Pulmonary/Chest: Breath sounds  normal. No stridor. No respiratory distress. She has no wheezes. She has no rhonchi. She has no rales.   Abdominal: Soft. There is no tenderness.   Musculoskeletal: Normal range of motion. She exhibits no edema or tenderness.   Lymphadenopathy:     She has cervical adenopathy (bilateral anterior cervical chain lymphadenopathy that is TTP, no posterior cervical lymphadenopathy present).   Neurological: She is alert and oriented to person, place, and time. She has normal strength. No cranial nerve deficit or sensory deficit.   Skin: Skin is warm and dry. Capillary refill takes less than 2 seconds. No rash noted. No erythema. No pallor.   Psychiatric: She has a normal mood and affect. Her behavior is normal. Thought content normal.         ED Course   Procedures  Labs Reviewed - No data to display       Imaging Results          X-Ray Chest PA And Lateral (Final result)  Result time 06/20/18 16:59:47    Final result by Sunny Bustillos MD (06/20/18 16:59:47)                 Impression:      No radiographic acute intrathoracic process seen.      Electronically signed by: Sunny Bustillos MD  Date:    06/20/2018  Time:    16:59             Narrative:    EXAMINATION:  XR CHEST PA AND LATERAL    CLINICAL HISTORY:  Other specified symptoms and signs involving the circulatory and respiratory systems    TECHNIQUE:  PA and lateral views of the chest were performed.    COMPARISON:  Right shoulder series 05/22/2014    FINDINGS:  Resolution is somewhat limited by body habitus with underpenetration.  Symmetric appearing nipple shadows project over both lung bases on the frontal view.  The lungs are clear, with normal appearance of pulmonary vasculature and no pleural effusion or pneumothorax.    The cardiac silhouette is normal in size. The hilar and mediastinal contours are unremarkable.    Dextrocurvature of the thoracic spine.  Osseous structures otherwise appear intact.                                 Medical Decision Making:    Initial Assessment:   Patient with history asthma with TID inhaler use presents for bronchitis/URI symptoms x 2-3 weeks. Sputum has brown streaking, likely post nasal drip from mechanical nose bleed. No consitutional symptoms. Exam unremarkable, lung sounds appropriate.  Differential Diagnosis:   DDX bronchitis with underlying active airway disease, URI, LRI, atypical PNA, exudative pharyngitis. Physical exam and history taking lower clinical suspicion for strep pharyngitis, PNA, pleurisy, PE, ACS, EBV.  ED Management:  Plain films of chest unremarkable for ronald consolidation nor acute abnormality. Due to underlying airway disease, I will treat bronchitis with Z-pack and burst of steroids. Patient road tested in ED with appropriately maintained oxygen sats. I considered but do not suspect emergent etiology of patient's symptoms, nor acute asthma exacerbation. Patient agreed with plan of care and voiced understanding. Discharged home in stable condition with strict ED return precautions.    Jessica Benitez PA-C  06/20/2018    I discussed the following case, diagnosis and plan of care with attending physician.                        Clinical Impression:   The primary encounter diagnosis was Bronchitis. A diagnosis of Chest congestion was also pertinent to this visit.      Disposition:   Disposition: Discharged  Condition: Stable                        Jessica Benitez PA-C  06/20/18 9411     The total amount of time listed was spent reviewing the hospital notes, laboratory values, imaging findings, assessing/counseling the patient, discussing with nursing staff.

## 2022-08-04 NOTE — DISCHARGE NOTE PROVIDER - NSDCFUSCHEDAPPT_GEN_ALL_CORE_FT
[facility]  ORTHOSURG [address] [place] [address]  Scheduled Appointment: 08/19/2022    Pipe Bustamante  [facility]  ORTHOSUR[address]  Scheduled Appointment: 10/04/2022

## 2022-08-04 NOTE — DISCHARGE NOTE PROVIDER - NSDCMRMEDTOKEN_GEN_ALL_CORE_FT
amLODIPine 5 mg oral tablet: 1 tab(s) orally once a day   acetaminophen 500 mg oral tablet: 2 tab(s) orally every 8 hours  Adult Aspirin Regimen 81 mg oral delayed release tablet: 1 tab(s) orally every 12 hours   take 2 hours before celebrex   amLODIPine 5 mg oral tablet: 1 tab(s) orally once a day  CeleBREX 200 mg oral capsule: 1 cap(s) orally every 12 hours   take 2 hours after aspirin  omeprazole 20 mg oral delayed release capsule: 1 cap(s) orally once a day   oxyCODONE 5 mg oral tablet: 1-2 tab(s) orally every 4 hours, As Needed for moderate pain MDD:6  polyethylene glycol 3350 oral powder for reconstitution: 17 gram(s) orally once a day (at bedtime), As Needed  senna leaf extract oral tablet: 2 tab(s) orally once a day (at bedtime), As Needed

## 2022-08-04 NOTE — PHYSICAL THERAPY INITIAL EVALUATION ADULT - PERTINENT HX OF CURRENT PROBLEM, REHAB EVAL
reports over 5 year history of left knee pain.  Patient states that he has received cortisone injections in the past along with gel injections with no relief.  Pain level 8/10 with activity and 3/10 at rest.  Currently wearing knee brace for support.

## 2022-08-04 NOTE — DISCHARGE NOTE PROVIDER - NSDCFUADDINST_GEN_ALL_CORE_FT
For Constipation :   • Increase your water intake. Drink at least 8 glasses of water daily.  • Try adding fiber to your diet by eating fruits, vegetables and foods that are rich in grains.  • If you do experience constipation, you may take an over-the-counter stool softener/laxative such as Alba Colace, Senekot or Milk of Magnesia.  - Call your doctor if you experience:  • An increase in pain not controlled by pain medication or change in activity or  position.  • Temperature greater than 101° F.  • Redness, increased swelling or foul smelling drainage from or around the  incision.  • Numbness, tingling or a change in color or temperature of the operative leg.  • Call your doctor immediately if you experience chest pain, shortness of breath or calf pain.

## 2022-08-04 NOTE — DISCHARGE NOTE PROVIDER - NSDCCPCAREPLAN_GEN_ALL_CORE_FT
PRINCIPAL DISCHARGE DIAGNOSIS  Diagnosis: Primary osteoarthritis of left knee  Assessment and Plan of Treatment: Physical Therapy/Occupational Therapy for: ambulation, transfers, stairs, ADL's (activities of daily living), and range of motion exercises  -Activity  • Weight Bearing as tolerated with rolling walker.  • Take short, frequent walks increasing the distance that you walk each day as tolerated.  • Change your position every hour to decrease pain and stiffness.  • Continue the exercises taught to you by your physical therapist.  • No driving until cleared by the doctor.  • No tub baths, hot tubs, or swimming pools until instructed by your doctor.  • Do not squat down on the floor.  • Do not kneel or twist your knee.  • Range of Motion Goals: Flexion= 120 degrees, Extension = 0 degrees  Ice & Elevate leg to decrease pain/swelling  Keep incision area clean and dry.    You have prineo tape over your incision, this is a waterproof seal.  You may shower if there is no drainage present from wound.  Allow warm/soapy water to run over prineo tape, dab dry with a clean towel after shower.  No Salves/ointments over prineo tape, tape will be removed at surgeons office at post operative visit   Apply Cryocuff to operative knee for 20 minutes at a time several times a day with at least a one hour break inbetween sessions.

## 2022-08-04 NOTE — CONSULT NOTE ADULT - ASSESSMENT
65 yo M S/P L TKR    Aftercare following surgery  -WBAT  -PT/OT  -Early ambulation  -Pain management  -Incentive Spirometry  -DVT ppx as per ortho    ALISTAIR  Pulm eval    HTN  Amlodipine

## 2022-08-04 NOTE — DISCHARGE NOTE PROVIDER - HOSPITAL COURSE
This patient was admitted to [facility] with a history of severe degenerative joint disease of the left knee.  Patient went to Pre-Surgical Testing at [facility] and was medically cleared to undergo a left total knee replacement by Dr. Bustamante on 8/4/22.  No operative or ceferino-operative complications arose during patients hospital course.  Patient received antibiotic according to SCIP guidelines for infection prevention.  Aspirin was given for DVT prophylaxis.  Anesthesia, Medical Hospitalist, Physical Therapy and Occupational Therapy were consulted. Patient is stable for discharge with a good prognosis.  Appropriate discharge instructions and medications are provided in this document.

## 2022-08-04 NOTE — DISCHARGE NOTE PROVIDER - CARE PROVIDER_API CALL
Pipe Bustamante)  Orthopedics  [address] [facility], [address]  [place] [address]  Phone: [phone]  Fax: [phone]  Scheduled Appointment: 08/19/2022 09:30 AM

## 2022-08-04 NOTE — PRE-OP CHECKLIST - NS PREOP CHK MONITOR ANESTHESIA CONSENT
Class I (easy) - visualization of the soft palate, fauces, uvula, and both anterior and posterior pillars
done

## 2022-08-04 NOTE — PHYSICAL THERAPY INITIAL EVALUATION ADULT - CRITERIA FOR SKILLED THERAPEUTIC INTERVENTIONS
HCPT, RW, cane/impairments found/anticipated equipment needs at discharge/anticipated discharge recommendation

## 2022-08-04 NOTE — CONSULT NOTE ADULT - ASSESSMENT
67 yo male reports over 5 year history of left knee pain    OA  ALISTAIR  Obesity  Asthma  PUD   HTN  RA   65 yo male reports over 5 year history of left knee pain    OA  ALISTAIR  Obesity  Asthma  PUD   HTN  RA    cpap night time - alistair dx - sleep hygiene review  I emani  dvt p  pain assessment - caution with opioids in pt with ALISTAIR  bowel rx regimen  assist with needs  PT - Ortho follow up  monitor VS and Sat  cvs rx regimen - BP control  hx of Asthma - controlled as per pt - may need Prn Albuterol - will monitor

## 2022-08-04 NOTE — CONSULT NOTE ADULT - SUBJECTIVE AND OBJECTIVE BOX
Date/Time Patient Seen:  		  Referring MD:   Data Reviewed	       Patient is a 66y old  Male who presents with a chief complaint of     Subjective/HPI  seen and examined  planned for OR  known ALISTAIR  uses CPAP night time  alert  verbal  old records reviewed  seen in Pre Op area       PAST MEDICAL & SURGICAL HISTORY:  Adenoma    Rheumatoid arthritis involving shoulder with positive rheumatoid factor, unspecified laterality    Jacqueline&#x27;s ring  endoscopy every 3 years and stricture is &quot;stretched&quot;, last 2021    Sleep apnea, unspecified type    Hypertension    Osteoarthritis    ALISTAIR on CPAP    COVID-19 vaccine series completed    Gastric ulcer  as child    Morbid obesity with BMI of 40.0-44.9, adult    Osteoarthritis of left knee    History of cholecystectomy    Umbilical hernia    Abdominal hernia    Lipoma of hand  2017    H/O arthroscopy of left knee  2018    S/P laparoscopic cholecystectomy  2014    H/O inguinal hernia repair  bilateral with umbilical hernia repair 2001    H/O cataract extraction  bilateral 2019    History of arthroplasty of right knee  3/2022          Medication list         MEDICATIONS  (STANDING):  ceFAZolin   IVPB 3000 milliGRAM(s) IV Intermittent once    MEDICATIONS  (PRN):         Vitals log        ICU Vital Signs Last 24 Hrs  T(C): 36.7 (04 Aug 2022 09:27), Max: 36.7 (04 Aug 2022 09:27)  T(F): 98 (04 Aug 2022 09:27), Max: 98 (04 Aug 2022 09:27)  HR: 61 (04 Aug 2022 09:27) (61 - 61)  BP: 142/72 (04 Aug 2022 09:27) (142/72 - 142/72)  BP(mean): --  ABP: --  ABP(mean): --  RR: 16 (04 Aug 2022 09:27) (16 - 16)  SpO2: 100% (04 Aug 2022 09:27) (100% - 100%)             Input and Output:  I&O's Detail      Lab Data          FAMILY HISTORY:  Father  Still living? No  Family history of colon cancer, Age at diagnosis: Age Unknown    Mother  Still living? No  FHx: kidney failure, Age at diagnosis: Age Unknown.     COVID-19 Vaccine Assessment:  · History of COVID-19 vaccination	Yes  · Brand of COVID-19 vaccination	Pfizer dose 1, 2, and 3  · Date of last vaccination	02-Jul-2021  · Have you had a first COVID-19 booster?	Yes  · Brand of first COVID-19 booster	Pfizer  · Date of first COVID-19 booster	14-Jan-2022  · Have you had a second COVID-19 booster?	No  · Will the patient accept the COVID-19 vaccine if eligible and it is available?	No     Social History:  · Marital Status	  · Lives With	spouse     Substance Use History:  · Substance Use	never used     Alcohol Use History:  · Have you ever consumed alcohol	never     Tobacco Usage:  · Tobacco Usage: Never smoker     Passive Smoke Exposure:  · Passive Smoke Exposure	No    Presurgical Screening:    Cardiovascular:  · Energy expenditure (mets)	>3     Cardiac Tests:  · Last Stress Test	2022     Sleep Apnea Screening:  Confirmed Obstructive Sleep Apnea (ALISTAIR)?: Yes  Treatment: CPAP uses nasal mask     Airway:  · Airway	normal  · Mallampati Score	Class II - visualization of the soft palate, fauces, and uvula  · Dentition	normal     Anesthesia History:  · Previous Reaction to Anesthesia	none     Transfusion History:  · Blood Avoidance/Restrictions	none  · Previous Blood Transfusion	no    Core Measures/Disease Management:    Heart Failure:  Does this patient have a history of or has been diagnosed with heart failure? no.    Clinical Risk Assessment:    Catheterizations/Incisions/Drainage:  · Venous Thromboembolism	no  · Pulmonary Embolus	no      VTE Risk Factor Assessment:   · Age	(2) age 61-74 years  · BMI	(1) obesity (BMI greater than 25)  · History	(0) indicator not present  · Current Status	(1) other risk factor (includes escalating BMI, pack-years of smoking, diabetes requiring insulin, chemotherapy, female gender and length of surgery)  · Current Labs/Test Results	none  · For Women Only	(0) indicator not present  · VTE Score	4  · Physician's Assessment of Risk	Low Risk     Hepatitis C Status:  · Hepatitis C Status	Negative       Devices:  · Implants/Medical Devices	Artificial joint; right knee    Health Management:    Health Management:  · Pap smear test done in past 3 years	not applicable (Male)     Reproductive, Female:  · Is Patient Pregnant?	not applicable (Male)     Reproductive, Male:  · Last Prostate Exam	referred to PCP          Review of Systems	  planned for OR    Objective     Physical Examination  cn grossly int  heart s1s2  lung dec BS  abd soft  head nc  no wheeze  moves all extr        Pertinent Lab findings & Imaging      Portillo:  NO   Adequate UO     I&O's Detail           Discussed with:     Cultures:	        Radiology      ACC: [id number] EXAM:  XR KNEE 1-2 VIEWS RT                          PROCEDURE DATE:  03/14/2022          INTERPRETATION:  Evaluation right TKR    2 views right knee portable postop in OR.    Right TKR with intact hardware satisfactory alignment. No fractures.   Postoperative soft tissue changes.    IMPRESSION: Satisfactory postoperative appearance right TKR    --- End of Report ---            SHANIQUE HERNANDEZ MD; Attending Radiologist  This document has been electronically signed. Mar 14 2022  2:23PM                        
Patient is a 66y old  Male who presents with a chief complaint of Left total knee replacement (04 Aug 2022 11:37)      FROM ADMISSION H+P:   HPI: 65 yo male reports over 5 year history of left knee pain.  Patient states that he has received cortisone injections in the past along with gel injections with no relief. Patient seen post op. Tolerated procedure well without complications. C/o mild pain at surgical site. Denies any new complaints.      ----  PAST MEDICAL & SURGICAL HISTORY:  Jacqueline&#x27;s ring  endoscopy every 3 years and stricture is &quot;stretched&quot;, last 2021      Hypertension      Osteoarthritis      ALISTAIR on CPAP      COVID-19 vaccine series completed      Gastric ulcer  as child      Morbid obesity with BMI of 40.0-44.9, adult      Osteoarthritis of left knee      Lipoma of hand  2017      H/O arthroscopy of left knee  2018      S/P laparoscopic cholecystectomy  2014      H/O inguinal hernia repair  bilateral with umbilical hernia repair 2001      H/O cataract extraction  bilateral 2019      History of arthroplasty of right knee  3/2022          ----  Home Medications:  amLODIPine 5 mg oral tablet: 1 tab(s) orally once a day (04 Aug 2022 09:47)    ----  FAMILY HISTORY:  Family history of colon cancer (Father)    FHx: kidney failure (Mother)        ----  Allergies    No Known Allergies    Intolerances        ----  Social History:  Denies current alcohol, tobacco or drug use     ----  REVIEW OF SYSTEMS:  CONSTITUTIONAL: denies fever, chills, fatigue, weakness  HEENT: denies blurred vision, sore throat  SKIN: denies new lesions, rash  CARDIOVASCULAR: denies chest pain, chest pressure, palpitations  RESPIRATORY: denies shortness of breath, sputum production  GASTROINTESTINAL: denies nausea, vomiting, diarrhea, abdominal pain  GENITOURINARY: denies dysuria, discharge  NEUROLOGICAL: denies numbness, headache, focal weakness  MUSCULOSKELETAL: denies new joint pain, muscle aches  HEMATOLOGIC: denies gross bleeding, bruising    ----  PHYSICAL EXAM:  GENERAL: patient appears well, no acute distress, appropriately interactive  EYES: sclera clear, no exudates  LUNGS: good air entry bilaterally, clear to auscultation, symmetric breath sounds  HEART: soft S1/S2, regular rate and rhythm, no murmurs noted, no noted edema to bilateral lower extremities  GASTROINTESTINAL: abdomen is soft, nontender, nondistended, normoactive bowel sounds, no palpable masses  INTEGUMENT: good skin turgor, appropriate for ethnicity, appears well perfused, no jaundice noted  MUSCULOSKELETAL: dressing clean/dry/intact, no clubbing or cyanosis, no obvious deformity  NEUROLOGIC: awake, alert, oriented x3, good muscle tone in 4 extremities, no obvious sensory deficits  PSYCHIATRIC: mood is good, affect is congruent with mood, linear and logical thought process    T(C): 36.7 (08-04-22 @ 09:27), Max: 36.7 (08-04-22 @ 09:27)  HR: 54 (08-04-22 @ 15:20) (47 - 61)  BP: 105/59 (08-04-22 @ 15:20) (91/53 - 145/72)  RR: 15 (08-04-22 @ 15:20) (12 - 21)  SpO2: 98% (08-04-22 @ 15:20) (96% - 100%)  Wt(kg): --    ----  INPATIENT MEDICATIONS  acetaminophen   IVPB .. 1000 milliGRAM(s) IV Intermittent once  ceFAZolin   IVPB 3000 milliGRAM(s) IV Intermittent every 8 hours  HYDROmorphone  Injectable 0.5 milliGRAM(s) IV Push every 10 minutes PRN  lactated ringers. 1000 milliLiter(s) IV Continuous <Continuous>  ondansetron Injectable 4 milliGRAM(s) IV Push once PRN  oxyCODONE    IR 5 milliGRAM(s) Oral once PRN      ----  I&O's Summary    04 Aug 2022 07:01  -  04 Aug 2022 15:57  --------------------------------------------------------  IN: 1880 mL / OUT: 50 mL / NET: 1830 mL        LABS:              CAPILLARY BLOOD GLUCOSE                    ----  Personally reviewed:  Vital sign trends: [ x ] yes    [  ] no     [  ] n/a  Laboratory results: [x  ] yes    [  ] no     [  ] n/a

## 2022-08-05 ENCOUNTER — TRANSCRIPTION ENCOUNTER (OUTPATIENT)
Age: 67
End: 2022-08-05

## 2022-08-05 VITALS
DIASTOLIC BLOOD PRESSURE: 82 MMHG | OXYGEN SATURATION: 97 % | HEART RATE: 48 BPM | RESPIRATION RATE: 20 BRPM | SYSTOLIC BLOOD PRESSURE: 146 MMHG | TEMPERATURE: 98 F

## 2022-08-05 LAB
ANION GAP SERPL CALC-SCNC: 6 MMOL/L — SIGNIFICANT CHANGE UP (ref 5–17)
BUN SERPL-MCNC: 17 MG/DL — SIGNIFICANT CHANGE UP (ref 7–23)
CALCIUM SERPL-MCNC: 8.9 MG/DL — SIGNIFICANT CHANGE UP (ref 8.4–10.5)
CHLORIDE SERPL-SCNC: 107 MMOL/L — SIGNIFICANT CHANGE UP (ref 96–108)
CO2 SERPL-SCNC: 25 MMOL/L — SIGNIFICANT CHANGE UP (ref 22–31)
CREAT SERPL-MCNC: 0.85 MG/DL — SIGNIFICANT CHANGE UP (ref 0.5–1.3)
EGFR: 96 ML/MIN/1.73M2 — SIGNIFICANT CHANGE UP
GLUCOSE SERPL-MCNC: 144 MG/DL — HIGH (ref 70–99)
HCT VFR BLD CALC: 40.2 % — SIGNIFICANT CHANGE UP (ref 39–50)
HGB BLD-MCNC: 13.1 G/DL — SIGNIFICANT CHANGE UP (ref 13–17)
MCHC RBC-ENTMCNC: 28.7 PG — SIGNIFICANT CHANGE UP (ref 27–34)
MCHC RBC-ENTMCNC: 32.6 GM/DL — SIGNIFICANT CHANGE UP (ref 32–36)
MCV RBC AUTO: 88.2 FL — SIGNIFICANT CHANGE UP (ref 80–100)
NRBC # BLD: 0 /100 WBCS — SIGNIFICANT CHANGE UP (ref 0–0)
PLATELET # BLD AUTO: 233 K/UL — SIGNIFICANT CHANGE UP (ref 150–400)
POTASSIUM SERPL-MCNC: 4.2 MMOL/L — SIGNIFICANT CHANGE UP (ref 3.5–5.3)
POTASSIUM SERPL-SCNC: 4.2 MMOL/L — SIGNIFICANT CHANGE UP (ref 3.5–5.3)
RBC # BLD: 4.56 M/UL — SIGNIFICANT CHANGE UP (ref 4.2–5.8)
RBC # FLD: 12.7 % — SIGNIFICANT CHANGE UP (ref 10.3–14.5)
SODIUM SERPL-SCNC: 138 MMOL/L — SIGNIFICANT CHANGE UP (ref 135–145)
WBC # BLD: 21.12 K/UL — HIGH (ref 3.8–10.5)
WBC # FLD AUTO: 21.12 K/UL — HIGH (ref 3.8–10.5)

## 2022-08-05 PROCEDURE — 36415 COLL VENOUS BLD VENIPUNCTURE: CPT

## 2022-08-05 PROCEDURE — 99239 HOSP IP/OBS DSCHRG MGMT >30: CPT

## 2022-08-05 PROCEDURE — 97165 OT EVAL LOW COMPLEX 30 MIN: CPT

## 2022-08-05 PROCEDURE — 80053 COMPREHEN METABOLIC PANEL: CPT

## 2022-08-05 PROCEDURE — 97116 GAIT TRAINING THERAPY: CPT

## 2022-08-05 PROCEDURE — 88305 TISSUE EXAM BY PATHOLOGIST: CPT

## 2022-08-05 PROCEDURE — C1889: CPT

## 2022-08-05 PROCEDURE — 97110 THERAPEUTIC EXERCISES: CPT

## 2022-08-05 PROCEDURE — 85027 COMPLETE CBC AUTOMATED: CPT

## 2022-08-05 PROCEDURE — 97161 PT EVAL LOW COMPLEX 20 MIN: CPT

## 2022-08-05 PROCEDURE — 80048 BASIC METABOLIC PNL TOTAL CA: CPT

## 2022-08-05 PROCEDURE — 94660 CPAP INITIATION&MGMT: CPT

## 2022-08-05 PROCEDURE — 97535 SELF CARE MNGMENT TRAINING: CPT

## 2022-08-05 PROCEDURE — C1713: CPT

## 2022-08-05 PROCEDURE — 97530 THERAPEUTIC ACTIVITIES: CPT

## 2022-08-05 PROCEDURE — C1776: CPT

## 2022-08-05 PROCEDURE — 73562 X-RAY EXAM OF KNEE 3: CPT

## 2022-08-05 PROCEDURE — 88311 DECALCIFY TISSUE: CPT

## 2022-08-05 RX ORDER — ACETAMINOPHEN 500 MG
2 TABLET ORAL
Qty: 0 | Refills: 0 | DISCHARGE
Start: 2022-08-05

## 2022-08-05 RX ORDER — OXYCODONE HYDROCHLORIDE 5 MG/1
1 TABLET ORAL
Qty: 42 | Refills: 0
Start: 2022-08-05 | End: 2022-08-11

## 2022-08-05 RX ORDER — POLYETHYLENE GLYCOL 3350 17 G/17G
17 POWDER, FOR SOLUTION ORAL
Qty: 0 | Refills: 0 | DISCHARGE
Start: 2022-08-05

## 2022-08-05 RX ORDER — SENNA PLUS 8.6 MG/1
2 TABLET ORAL
Qty: 0 | Refills: 0 | DISCHARGE
Start: 2022-08-05

## 2022-08-05 RX ADMIN — CELECOXIB 200 MILLIGRAM(S): 200 CAPSULE ORAL at 08:42

## 2022-08-05 RX ADMIN — Medication 200 MILLIGRAM(S): at 04:38

## 2022-08-05 RX ADMIN — Medication 1000 MILLIGRAM(S): at 00:30

## 2022-08-05 RX ADMIN — Medication 101.6 MILLIGRAM(S): at 05:39

## 2022-08-05 RX ADMIN — Medication 1000 MILLIGRAM(S): at 08:42

## 2022-08-05 RX ADMIN — CELECOXIB 200 MILLIGRAM(S): 200 CAPSULE ORAL at 09:30

## 2022-08-05 RX ADMIN — PANTOPRAZOLE SODIUM 40 MILLIGRAM(S): 20 TABLET, DELAYED RELEASE ORAL at 05:40

## 2022-08-05 RX ADMIN — Medication 1000 MILLIGRAM(S): at 09:30

## 2022-08-05 RX ADMIN — Medication 81 MILLIGRAM(S): at 05:39

## 2022-08-05 NOTE — PROGRESS NOTE ADULT - ASSESSMENT
65 yo male reports over 5 year history of left knee pain    OA  ALISTAIR  Obesity  Asthma  PUD   HTN  RA    POD 1  vs noted  used NIPPV overnight    cpap night time - alistair dx - sleep hygiene review  I emani  dvt p  pain assessment - caution with opioids in pt with ALISTAIR  bowel rx regimen  assist with needs  PT - Ortho follow up  monitor VS and Sat  cvs rx regimen - BP control  hx of Asthma - controlled as per pt - may need Prn Albuterol - will monitor

## 2022-08-05 NOTE — OCCUPATIONAL THERAPY INITIAL EVALUATION ADULT - LIVES WITH, PROFILE
Pt lives in a private home with his wife with 3 TIMOTHY without handrails and 2 stairs inside + a full flight going upstairs with handrails to the bedroom. Pt states he has a hospital bed set up on the main floor where he will be staying as prn. Pt has a tub shower and owns a RW, cane, shower chair and a shoe horn./spouse

## 2022-08-05 NOTE — PROGRESS NOTE ADULT - SUBJECTIVE AND OBJECTIVE BOX
Discharge medication calendar:  ASA EC 81mg q12h x 4 weeks  APAP 1000mg q8h x 2-3 weeks  Celecoxib 200mg q12h x 2-3 weeks  Omeprazole 20mg QAM x 4 weeks  Narcotic PRN  Docusate 100mg TID while taking narcotic  Miralax, Senna, or Bisacodyl PRN for treatment of constipation  
JUAN DIEGO OBREGON  [id number]    Pt is a 66y year old Male s/p left TKR. pain is 2/10. (+) Voids, tolerating regular diet. Denies chest pain/shortness of breath/nausea/vomitting.     Vital Signs Last 24 Hrs  T(C): 36.4 (05 Aug 2022 07:50), Max: 37 (04 Aug 2022 18:04)  T(F): 97.6 (05 Aug 2022 07:50), Max: 98.6 (04 Aug 2022 18:04)  HR: 48 (05 Aug 2022 07:50) (47 - 69)  BP: 146/82 (05 Aug 2022 07:50) (91/53 - 146/82)  BP(mean): --  RR: 20 (05 Aug 2022 07:50) (12 - 22)  SpO2: 97% (05 Aug 2022 07:50) (95% - 100%)    Parameters below as of 05 Aug 2022 07:50  Patient On (Oxygen Delivery Method): room air        I&O's Detail    04 Aug 2022 07:01  -  05 Aug 2022 07:00  --------------------------------------------------------  IN:    Lactated Ringers: 1400 mL    Lactated Ringers: 1000 mL    Oral Fluid: 840 mL    Sodium Chloride 0.9% Bolus: 500 mL  Total IN: 3740 mL    OUT:    Blood Loss (mL): 50 mL    Voided (mL): 2200 mL  Total OUT: 2250 mL    Total NET: 1490 mL                                14.3   14.74 )-----------( 227      ( 04 Aug 2022 20:00 )             43.9     08-04    136  |  103  |  18  ----------------------------<  196<H>  3.7   |  25  |  1.13    Ca    8.9      04 Aug 2022 20:00    TPro  6.6  /  Alb  3.2<L>  /  TBili  0.5  /  DBili  x   /  AST  15  /  ALT  21  /  AlkPhos  83  08-04        PE:   LLE: Dressing changed, incision clean and dry, no erythema or drainage, prineo intact SILT distally, (+2) DP/PT pulses, EHL/FHL/TA intact, Capillary refill < 2 seconds. negative calf tenderness PAS on,    A: 66y year old Male s/p left TKR POD#1    Plan:   -DVT ppx = aspirin enteric coated 81 milliGRAM(s) Oral every 12 hours    -PT/OT = OOB  -Pain control   -Medicine to follow   -continue to follow Labs  -continue use of PAS  -Dispo: home planning
Patient is a 66y old  Male who presents with a chief complaint of Left knee pain (04 Aug 2022 16:19)      INTERVAL HPI/OVERNIGHT EVENTS: Patient seen and examined at bedside. No overnight events    MEDICATIONS  (STANDING):  acetaminophen     Tablet .. 1000 milliGRAM(s) Oral every 8 hours  aspirin enteric coated 81 milliGRAM(s) Oral every 12 hours  celecoxib 200 milliGRAM(s) Oral every 12 hours  HYDROmorphone  Injectable 0.5 milliGRAM(s) IV Push once  lactated ringers. 1000 milliLiter(s) (100 mL/Hr) IV Continuous <Continuous>  pantoprazole    Tablet 40 milliGRAM(s) Oral before breakfast  polyethylene glycol 3350 17 Gram(s) Oral at bedtime  senna 2 Tablet(s) Oral at bedtime    MEDICATIONS  (PRN):  magnesium hydroxide Suspension 30 milliLiter(s) Oral daily PRN Constipation  ondansetron Injectable 4 milliGRAM(s) IV Push every 6 hours PRN Nausea and/or Vomiting  oxyCODONE    IR 5 milliGRAM(s) Oral every 3 hours PRN Moderate Pain (4 - 6)  oxyCODONE    IR 10 milliGRAM(s) Oral every 3 hours PRN Severe Pain (7 - 10)      Allergies    No Known Allergies    Intolerances        REVIEW OF SYSTEMS:  CONSTITUTIONAL: No fever or chills  HEENT:  No headache, no sore throat  RESPIRATORY: No cough, wheezing, or shortness of breath  CARDIOVASCULAR: No chest pain, palpitations, or leg swelling  GASTROINTESTINAL: No abd pain, nausea, vomiting, or diarrhea  GENITOURINARY: No dysuria, frequency, or hematuria  NEUROLOGICAL: no focal weakness or dizziness  MUSCULOSKELETAL: no myalgias     Vital Signs Last 24 Hrs  T(C): 36.4 (05 Aug 2022 07:50), Max: 37 (04 Aug 2022 18:04)  T(F): 97.6 (05 Aug 2022 07:50), Max: 98.6 (04 Aug 2022 18:04)  HR: 48 (05 Aug 2022 07:50) (47 - 69)  BP: 146/82 (05 Aug 2022 07:50) (91/53 - 146/82)  BP(mean): --  RR: 20 (05 Aug 2022 07:50) (12 - 22)  SpO2: 97% (05 Aug 2022 07:50) (95% - 98%)    Parameters below as of 05 Aug 2022 07:50  Patient On (Oxygen Delivery Method): room air        PHYSICAL EXAM:  GENERAL: NAD  HEENT:  NCAT, moist mucous membranes  CHEST/LUNG:  CTA b/l, no rales, wheezes, or rhonchi  HEART:  RRR, S1, S2  ABDOMEN:  BS+, soft, nontender, nondistended  EXTREMITIES: no edema, cyanosis, or calf tenderness  NERVOUS SYSTEM: AA&Ox3, sensation intact    LABS:                        13.1   21.12 )-----------( 233      ( 05 Aug 2022 06:00 )             40.2     CBC Full  -  ( 05 Aug 2022 06:00 )  WBC Count : 21.12 K/uL  Hemoglobin : 13.1 g/dL  Hematocrit : 40.2 %  Platelet Count - Automated : 233 K/uL  Mean Cell Volume : 88.2 fl  Mean Cell Hemoglobin : 28.7 pg  Mean Cell Hemoglobin Concentration : 32.6 gm/dL  Auto Neutrophil # : x  Auto Lymphocyte # : x  Auto Monocyte # : x  Auto Eosinophil # : x  Auto Basophil # : x  Auto Neutrophil % : x  Auto Lymphocyte % : x  Auto Monocyte % : x  Auto Eosinophil % : x  Auto Basophil % : x    05 Aug 2022 06:00    138    |  107    |  17     ----------------------------<  144    4.2     |  25     |  0.85     Ca    8.9        05 Aug 2022 06:00    TPro  6.6    /  Alb  3.2    /  TBili  0.5    /  DBili  x      /  AST  15     /  ALT  21     /  AlkPhos  83     04 Aug 2022 20:00        CAPILLARY BLOOD GLUCOSE              RADIOLOGY & ADDITIONAL TESTS:    Consultant(s) Notes Reviewed:  [x] YES  [ ] NO    Care Discussed with [x] Consultants  [x] Patient  [ ] Family  [ ]      [ x] Other; RN  DVT ppx  
Date/Time Patient Seen:  		  Referring MD:   Data Reviewed	       Patient is a 66y old  Male who presents with a chief complaint of Left knee pain (04 Aug 2022 16:19)      Subjective/HPI     PAST MEDICAL & SURGICAL HISTORY:  Adenoma    Rheumatoid arthritis involving shoulder with positive rheumatoid factor, unspecified laterality    Jacqueline&#x27;s ring  endoscopy every 3 years and stricture is &quot;stretched&quot;, last 2021    Sleep apnea, unspecified type    Hypertension    Osteoarthritis    ALISTAIR on CPAP    COVID-19 vaccine series completed    Gastric ulcer  as child    Morbid obesity with BMI of 40.0-44.9, adult    Osteoarthritis of left knee    History of cholecystectomy    Umbilical hernia    Abdominal hernia    Lipoma of hand  2017    H/O arthroscopy of left knee  2018    S/P laparoscopic cholecystectomy  2014    H/O inguinal hernia repair  bilateral with umbilical hernia repair 2001    H/O cataract extraction  bilateral 2019    History of arthroplasty of right knee  3/2022          Medication list         MEDICATIONS  (STANDING):  acetaminophen     Tablet .. 1000 milliGRAM(s) Oral every 8 hours  aspirin enteric coated 81 milliGRAM(s) Oral every 12 hours  celecoxib 200 milliGRAM(s) Oral every 12 hours  HYDROmorphone  Injectable 0.5 milliGRAM(s) IV Push once  lactated ringers. 1000 milliLiter(s) (100 mL/Hr) IV Continuous <Continuous>  pantoprazole    Tablet 40 milliGRAM(s) Oral before breakfast  polyethylene glycol 3350 17 Gram(s) Oral at bedtime  senna 2 Tablet(s) Oral at bedtime    MEDICATIONS  (PRN):  magnesium hydroxide Suspension 30 milliLiter(s) Oral daily PRN Constipation  ondansetron Injectable 4 milliGRAM(s) IV Push every 6 hours PRN Nausea and/or Vomiting  oxyCODONE    IR 5 milliGRAM(s) Oral every 3 hours PRN Moderate Pain (4 - 6)  oxyCODONE    IR 10 milliGRAM(s) Oral every 3 hours PRN Severe Pain (7 - 10)         Vitals log        ICU Vital Signs Last 24 Hrs  T(C): 36.3 (05 Aug 2022 03:00), Max: 37 (04 Aug 2022 18:04)  T(F): 97.4 (05 Aug 2022 03:00), Max: 98.6 (04 Aug 2022 18:04)  HR: 50 (05 Aug 2022 03:00) (47 - 69)  BP: 117/70 (05 Aug 2022 03:00) (91/53 - 145/72)  BP(mean): --  ABP: --  ABP(mean): --  RR: 18 (05 Aug 2022 03:00) (12 - 22)  SpO2: 98% (05 Aug 2022 03:00) (95% - 100%)    O2 Parameters below as of 05 Aug 2022 03:00  Patient On (Oxygen Delivery Method): BiPAP/CPAP                 Input and Output:  I&O's Detail    04 Aug 2022 07:01  -  05 Aug 2022 06:11  --------------------------------------------------------  IN:    Lactated Ringers: 1400 mL    Oral Fluid: 840 mL    Sodium Chloride 0.9% Bolus: 500 mL  Total IN: 2740 mL    OUT:    Blood Loss (mL): 50 mL    Voided (mL): 1900 mL  Total OUT: 1950 mL    Total NET: 790 mL          Lab Data                        14.3   14.74 )-----------( 227      ( 04 Aug 2022 20:00 )             43.9     08-04    136  |  103  |  18  ----------------------------<  196<H>  3.7   |  25  |  1.13    Ca    8.9      04 Aug 2022 20:00    TPro  6.6  /  Alb  3.2<L>  /  TBili  0.5  /  DBili  x   /  AST  15  /  ALT  21  /  AlkPhos  83  08-04            Review of Systems	      Objective     Physical Examination    heart s1s2  lung dec BS  abd soft      Pertinent Lab findings & Imaging      Lindsey:  NO   Adequate UO     I&O's Detail    04 Aug 2022 07:01  -  05 Aug 2022 06:11  --------------------------------------------------------  IN:    Lactated Ringers: 1400 mL    Oral Fluid: 840 mL    Sodium Chloride 0.9% Bolus: 500 mL  Total IN: 2740 mL    OUT:    Blood Loss (mL): 50 mL    Voided (mL): 1900 mL  Total OUT: 1950 mL    Total NET: 790 mL               Discussed with:     Cultures:	        Radiology                            
JUAN DIEGO OBREGON   [id number]    Pt is a 66y year old Male s/p left TKR. Pain is 0/10. (+) small amount of urinary incontinence but patient says he still has some numbness and that the same thing happened in 3/2022 when he had the other knee replaced.  Tolerating regular diet. Denies chest pain/shortness of breath/nausea/vomitting.     Vital Signs Last 24 Hrs  T(C): 36.7 (04 Aug 2022 09:27), Max: 36.7 (04 Aug 2022 09:27)  T(F): 98 (04 Aug 2022 09:27), Max: 98 (04 Aug 2022 09:27)  HR: 62 (04 Aug 2022 16:00) (47 - 62)  BP: 108/54 (04 Aug 2022 16:00) (91/53 - 145/72)  BP(mean): --  RR: 22 (04 Aug 2022 16:00) (12 - 22)  SpO2: 96% (04 Aug 2022 16:00) (96% - 100%)    Parameters below as of 04 Aug 2022 16:00  Patient On (Oxygen Delivery Method): room air          08-04 @ 07:01  -  08-04 @ 16:20  --------------------------------------------------------  IN: 1880 mL / OUT: 50 mL / NET: 1830 mL                    PE:   LLE: Dressing CDI, SILT distally, (+2) DP pulses, EHL/FHL/TA intact, Capillary refill < 2 seconds. Negative calf tenderness. PAS on.   Bladder scan 244ml.  CPM:    A: 66y year old Male s/p left TKR     Plan:   -monitor for voiding, similar scenario as previous surgeries, patient did not need a oviedo and began to void spontaneously in the past.  -DVT ppx = ecotrin, PAS  -PT/OT = OOB, TKR protocols  -Pain control excellent at this time  -Medicine to follow   -continue to follow Labs  -Incentive spirometry, continue use of PAS  -Dispo: Plan for home discharge tomorrow

## 2022-08-05 NOTE — OCCUPATIONAL THERAPY INITIAL EVALUATION ADULT - TRANSFER TRAINING, PT EVAL
Goal: Pt will complete a tub transfer with CGA by 2-3 sessions. Goal: Pt will complete a toilet transfer with modified independence by 2-3 sessions.

## 2022-08-05 NOTE — DISCHARGE NOTE NURSING/CASE MANAGEMENT/SOCIAL WORK - NSSCCONTNUM_GEN_ALL_CORE
Please contact the home care agency at  [phone] or  [phone] if you have not heard from them by 10 AM on the day after your hospital discharge.

## 2022-08-05 NOTE — PROGRESS NOTE ADULT - ASSESSMENT
65 yo M S/P L TKR    Aftercare following surgery  -WBAT  -PT/OT  -Early ambulation  -Pain management  -Incentive Spirometry  -DVT ppx as per ortho  -Mild leukocytosis post op. Likely reactive and due to steroids. No s/s of infection. Outpatient follow up with PCP for repeat blood work      ALISTAIR  Pulm eval noted     HTN  Amlodipine   65 yo M S/P L TKR    Aftercare following surgery  -WBAT  -PT/OT  -Early ambulation  -Pain management  -Incentive Spirometry  -DVT ppx as per ortho  -Mild leukocytosis post op. Likely reactive and due to steroids. No s/s of infection. Outpatient follow up with PCP for repeat blood work      ALISTAIR  Pulm eval noted     HTN  Amlodipine    Patient is medically stable for discharge with close outpatient follow up once cleared by PT and ortho

## 2022-08-05 NOTE — DISCHARGE NOTE NURSING/CASE MANAGEMENT/SOCIAL WORK - PATIENT PORTAL LINK FT
You can access the FollowMyHealth Patient Portal offered by [facility] by registering at the following website: [email or web address]. By joining [facility]’s FollowMyHealth portal, you will also be able to view your health information using other applications (apps) compatible with our system.

## 2022-08-05 NOTE — DISCHARGE NOTE NURSING/CASE MANAGEMENT/SOCIAL WORK - NSDCPEFALRISK_GEN_ALL_CORE
For information on Fall & Injury Prevention, visit: [email or web address] OR  [email or web address] OR  https://www.cdc.gov/steadi/patient.html

## 2022-08-19 ENCOUNTER — APPOINTMENT (OUTPATIENT)
Dept: ORTHOPEDIC SURGERY | Facility: CLINIC | Age: 67
End: 2022-08-19

## 2022-08-19 VITALS — TEMPERATURE: 98 F | SYSTOLIC BLOOD PRESSURE: 133 MMHG | HEART RATE: 75 BPM | DIASTOLIC BLOOD PRESSURE: 86 MMHG

## 2022-08-19 DIAGNOSIS — Z96.652 PRESENCE OF LEFT ARTIFICIAL KNEE JOINT: ICD-10-CM

## 2022-08-19 PROCEDURE — 73562 X-RAY EXAM OF KNEE 3: CPT | Mod: LT

## 2022-08-19 PROCEDURE — 99024 POSTOP FOLLOW-UP VISIT: CPT

## 2022-08-19 RX ORDER — AMOXICILLIN 500 MG/1
500 CAPSULE ORAL
Qty: 12 | Refills: 4 | Status: ACTIVE | COMMUNITY
Start: 2022-03-31 | End: 1900-01-01

## 2022-08-19 RX ORDER — ACETAMINOPHEN 500 MG/1
500 TABLET ORAL
Refills: 0 | Status: ACTIVE | COMMUNITY

## 2022-08-19 RX ORDER — CELECOXIB 200 MG/1
200 CAPSULE ORAL
Refills: 0 | Status: ACTIVE | COMMUNITY

## 2022-10-04 ENCOUNTER — APPOINTMENT (OUTPATIENT)
Dept: ORTHOPEDIC SURGERY | Facility: CLINIC | Age: 67
End: 2022-10-04

## 2022-10-04 VITALS — SYSTOLIC BLOOD PRESSURE: 143 MMHG | DIASTOLIC BLOOD PRESSURE: 85 MMHG | HEART RATE: 65 BPM

## 2022-10-04 DIAGNOSIS — Z96.652 PRESENCE OF LEFT ARTIFICIAL KNEE JOINT: ICD-10-CM

## 2022-10-04 PROCEDURE — 99024 POSTOP FOLLOW-UP VISIT: CPT

## 2022-10-04 PROCEDURE — 73562 X-RAY EXAM OF KNEE 3: CPT | Mod: LT

## 2023-01-20 NOTE — PHYSICAL THERAPY INITIAL EVALUATION ADULT - ASR EQUIP NEEDS DISCH PT EVAL
Prior to immunization administration, verified patients identity using patient s name and date of birth. Please see Immunization Activity for additional information.     Screening Questionnaire for Adult Immunization    Are you sick today?   No   Do you have allergies to medications, food, a vaccine component or latex?   Yes,clindamycin   Have you ever had a serious reaction after receiving a vaccination?   No   Do you have a long-term health problem with heart, lung, kidney, or metabolic disease (e.g., diabetes), asthma, a blood disorder, no spleen, complement component deficiency, a cochlear implant, or a spinal fluid leak?  Are you on long-term aspirin therapy?   Yes,asthma   Do you have cancer, leukemia, HIV/AIDS, or any other immune system problem?   No   Do you have a parent, brother, or sister with an immune system problem?   No   In the past 3 months, have you taken medications that affect  your immune system, such as prednisone, other steroids, or anticancer drugs; drugs for the treatment of rheumatoid arthritis, Crohn s disease, or psoriasis; or have you had radiation treatments?   No   Have you had a seizure, or a brain or other nervous system problem?   No   During the past year, have you received a transfusion of blood or blood    products, or been given immune (gamma) globulin or antiviral drug?   No   For women: Are you pregnant or is there a chance you could become       pregnant during the next month?   No   Have you received any vaccinations in the past 4 weeks?   No     Immunization questionnaire was positive for at least one answer.  Notified Provider. PATY cordoba..        Per orders of Dr. Farooq, injection of PVC20 given by Ministerio Calhoun MA. Patient instructed to remain in clinic for 15 minutes afterwards, and to report any adverse reaction to me immediately.       Screening performed by Ministerio Calhoun MA on 1/20/2023 at 8:15 AM.   pt has hospital bed, RW, cane, commode

## 2023-03-26 ENCOUNTER — EMERGENCY (EMERGENCY)
Facility: HOSPITAL | Age: 68
LOS: 0 days | Discharge: HOME | End: 2023-03-27
Attending: EMERGENCY MEDICINE
Payer: MEDICARE

## 2023-03-26 VITALS — HEIGHT: 70 IN | WEIGHT: 274.92 LBS

## 2023-03-26 DIAGNOSIS — S09.90XA UNSPECIFIED INJURY OF HEAD, INITIAL ENCOUNTER: ICD-10-CM

## 2023-03-26 DIAGNOSIS — K57.30 DIVERTICULOSIS OF LARGE INTESTINE WITHOUT PERFORATION OR ABSCESS WITHOUT BLEEDING: ICD-10-CM

## 2023-03-26 DIAGNOSIS — R51.9 HEADACHE, UNSPECIFIED: ICD-10-CM

## 2023-03-26 DIAGNOSIS — K44.9 DIAPHRAGMATIC HERNIA WITHOUT OBSTRUCTION OR GANGRENE: ICD-10-CM

## 2023-03-26 DIAGNOSIS — D17.79 BENIGN LIPOMATOUS NEOPLASM OF OTHER SITES: Chronic | ICD-10-CM

## 2023-03-26 DIAGNOSIS — Z96.651 PRESENCE OF RIGHT ARTIFICIAL KNEE JOINT: Chronic | ICD-10-CM

## 2023-03-26 DIAGNOSIS — G47.33 OBSTRUCTIVE SLEEP APNEA (ADULT) (PEDIATRIC): ICD-10-CM
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
## 2023-03-26 DIAGNOSIS — Z90.49 ACQUIRED ABSENCE OF OTHER SPECIFIED PARTS OF DIGESTIVE TRACT: Chronic | ICD-10-CM

## 2023-03-26 DIAGNOSIS — Z98.890 OTHER SPECIFIED POSTPROCEDURAL STATES: Chronic | ICD-10-CM

## 2023-03-26 DIAGNOSIS — R29.700 NIHSS SCORE 0: ICD-10-CM
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
## 2023-03-26 DIAGNOSIS — M17.12 UNILATERAL PRIMARY OSTEOARTHRITIS, LEFT KNEE: ICD-10-CM

## 2023-03-26 DIAGNOSIS — Z98.890 OTHER SPECIFIED POSTPROCEDURAL STATES: ICD-10-CM

## 2023-03-26 DIAGNOSIS — Z86.39 PERSONAL HISTORY OF OTHER ENDOCRINE, NUTRITIONAL AND METABOLIC DISEASE: ICD-10-CM

## 2023-03-26 DIAGNOSIS — I10 ESSENTIAL (PRIMARY) HYPERTENSION: ICD-10-CM

## 2023-03-26 DIAGNOSIS — Z90.49 ACQUIRED ABSENCE OF OTHER SPECIFIED PARTS OF DIGESTIVE TRACT: ICD-10-CM

## 2023-03-26 DIAGNOSIS — Z98.49 CATARACT EXTRACTION STATUS, UNSPECIFIED EYE: Chronic | ICD-10-CM

## 2023-03-26 LAB
ALBUMIN SERPL ELPH-MCNC: 3.6 G/DL — SIGNIFICANT CHANGE UP (ref 3.3–5)
ALP SERPL-CCNC: 87 U/L — SIGNIFICANT CHANGE UP (ref 40–120)
ALT FLD-CCNC: 21 U/L — SIGNIFICANT CHANGE UP (ref 12–78)
ANION GAP SERPL CALC-SCNC: 3 MMOL/L — LOW (ref 5–17)
APPEARANCE UR: CLEAR — SIGNIFICANT CHANGE UP
APTT BLD: 31.5 SEC — SIGNIFICANT CHANGE UP (ref 27.5–35.5)
AST SERPL-CCNC: 13 U/L — LOW (ref 15–37)
BASOPHILS # BLD AUTO: 0.09 K/UL — SIGNIFICANT CHANGE UP (ref 0–0.2)
BASOPHILS NFR BLD AUTO: 0.9 % — SIGNIFICANT CHANGE UP (ref 0–2)
BILIRUB SERPL-MCNC: 0.3 MG/DL — SIGNIFICANT CHANGE UP (ref 0.2–1.2)
BILIRUB UR-MCNC: NEGATIVE — SIGNIFICANT CHANGE UP
BUN SERPL-MCNC: 19 MG/DL — SIGNIFICANT CHANGE UP (ref 7–23)
CALCIUM SERPL-MCNC: 8.9 MG/DL — SIGNIFICANT CHANGE UP (ref 8.5–10.1)
CHLORIDE SERPL-SCNC: 113 MMOL/L — HIGH (ref 96–108)
CO2 SERPL-SCNC: 27 MMOL/L — SIGNIFICANT CHANGE UP (ref 22–31)
COLOR SPEC: YELLOW — SIGNIFICANT CHANGE UP
CREAT SERPL-MCNC: 1 MG/DL — SIGNIFICANT CHANGE UP (ref 0.5–1.3)
DIFF PNL FLD: NEGATIVE — SIGNIFICANT CHANGE UP
EGFR: 83 ML/MIN/1.73M2 — SIGNIFICANT CHANGE UP
EOSINOPHIL # BLD AUTO: 0.08 K/UL — SIGNIFICANT CHANGE UP (ref 0–0.5)
EOSINOPHIL NFR BLD AUTO: 0.8 % — SIGNIFICANT CHANGE UP (ref 0–6)
GLUCOSE SERPL-MCNC: 105 MG/DL — HIGH (ref 70–99)
GLUCOSE UR QL: NEGATIVE — SIGNIFICANT CHANGE UP
HCT VFR BLD CALC: 48.8 % — SIGNIFICANT CHANGE UP (ref 39–50)
HGB BLD-MCNC: 15.7 G/DL — SIGNIFICANT CHANGE UP (ref 13–17)
IMM GRANULOCYTES NFR BLD AUTO: 1.5 % — HIGH (ref 0–0.9)
INR BLD: 1.03 RATIO — SIGNIFICANT CHANGE UP (ref 0.88–1.16)
KETONES UR-MCNC: NEGATIVE — SIGNIFICANT CHANGE UP
LACTATE SERPL-SCNC: 1.1 MMOL/L — SIGNIFICANT CHANGE UP (ref 0.7–2)
LEUKOCYTE ESTERASE UR-ACNC: NEGATIVE — SIGNIFICANT CHANGE UP
LIDOCAIN IGE QN: 362 U/L — SIGNIFICANT CHANGE UP (ref 73–393)
LYMPHOCYTES # BLD AUTO: 1.83 K/UL — SIGNIFICANT CHANGE UP (ref 1–3.3)
LYMPHOCYTES # BLD AUTO: 18.2 % — SIGNIFICANT CHANGE UP (ref 13–44)
MCHC RBC-ENTMCNC: 28.9 PG — SIGNIFICANT CHANGE UP (ref 27–34)
MCHC RBC-ENTMCNC: 32.2 GM/DL — SIGNIFICANT CHANGE UP (ref 32–36)
MCV RBC AUTO: 89.9 FL — SIGNIFICANT CHANGE UP (ref 80–100)
MONOCYTES # BLD AUTO: 0.93 K/UL — HIGH (ref 0–0.9)
MONOCYTES NFR BLD AUTO: 9.3 % — SIGNIFICANT CHANGE UP (ref 2–14)
NEUTROPHILS # BLD AUTO: 6.96 K/UL — SIGNIFICANT CHANGE UP (ref 1.8–7.4)
NEUTROPHILS NFR BLD AUTO: 69.3 % — SIGNIFICANT CHANGE UP (ref 43–77)
NITRITE UR-MCNC: NEGATIVE — SIGNIFICANT CHANGE UP
PH UR: 5 — SIGNIFICANT CHANGE UP (ref 5–8)
PLATELET # BLD AUTO: 264 K/UL — SIGNIFICANT CHANGE UP (ref 150–400)
POTASSIUM SERPL-MCNC: 4 MMOL/L — SIGNIFICANT CHANGE UP (ref 3.5–5.3)
POTASSIUM SERPL-SCNC: 4 MMOL/L — SIGNIFICANT CHANGE UP (ref 3.5–5.3)
PROT SERPL-MCNC: 7.4 GM/DL — SIGNIFICANT CHANGE UP (ref 6–8.3)
PROT UR-MCNC: 30 MG/DL
PROTHROM AB SERPL-ACNC: 12 SEC — SIGNIFICANT CHANGE UP (ref 10.5–13.4)
RBC # BLD: 5.43 M/UL — SIGNIFICANT CHANGE UP (ref 4.2–5.8)
RBC # FLD: 13.8 % — SIGNIFICANT CHANGE UP (ref 10.3–14.5)
RBC CASTS # UR COMP ASSIST: SIGNIFICANT CHANGE UP /HPF (ref 0–4)
SODIUM SERPL-SCNC: 143 MMOL/L — SIGNIFICANT CHANGE UP (ref 135–145)
SP GR SPEC: 1.02 — SIGNIFICANT CHANGE UP (ref 1.01–1.02)
TROPONIN I, HIGH SENSITIVITY RESULT: 8.93 NG/L — SIGNIFICANT CHANGE UP
UROBILINOGEN FLD QL: NEGATIVE — SIGNIFICANT CHANGE UP
WBC # BLD: 10.04 K/UL — SIGNIFICANT CHANGE UP (ref 3.8–10.5)
WBC # FLD AUTO: 10.04 K/UL — SIGNIFICANT CHANGE UP (ref 3.8–10.5)
WBC UR QL: SIGNIFICANT CHANGE UP /HPF (ref 0–5)

## 2023-03-26 PROCEDURE — 83690 ASSAY OF LIPASE: CPT

## 2023-03-26 PROCEDURE — 93010 ELECTROCARDIOGRAM REPORT: CPT

## 2023-03-26 PROCEDURE — 83605 ASSAY OF LACTIC ACID: CPT

## 2023-03-26 PROCEDURE — 74177 CT ABD & PELVIS W/CONTRAST: CPT | Mod: 26,MA

## 2023-03-26 PROCEDURE — 71260 CT THORAX DX C+: CPT | Mod: 26,MA

## 2023-03-26 PROCEDURE — 70450 CT HEAD/BRAIN W/O DYE: CPT | Mod: 26,MA

## 2023-03-26 PROCEDURE — 72125 CT NECK SPINE W/O DYE: CPT | Mod: 26,MA

## 2023-03-26 PROCEDURE — 72125 CT NECK SPINE W/O DYE: CPT | Mod: MA

## 2023-03-26 PROCEDURE — 70450 CT HEAD/BRAIN W/O DYE: CPT | Mod: MA

## 2023-03-26 PROCEDURE — 85730 THROMBOPLASTIN TIME PARTIAL: CPT

## 2023-03-26 PROCEDURE — 85025 COMPLETE CBC W/AUTO DIFF WBC: CPT

## 2023-03-26 PROCEDURE — 81001 URINALYSIS AUTO W/SCOPE: CPT

## 2023-03-26 PROCEDURE — 93005 ELECTROCARDIOGRAM TRACING: CPT

## 2023-03-26 PROCEDURE — 71260 CT THORAX DX C+: CPT | Mod: MA

## 2023-03-26 PROCEDURE — 84484 ASSAY OF TROPONIN QUANT: CPT

## 2023-03-26 PROCEDURE — 36415 COLL VENOUS BLD VENIPUNCTURE: CPT

## 2023-03-26 PROCEDURE — 74177 CT ABD & PELVIS W/CONTRAST: CPT | Mod: MA

## 2023-03-26 PROCEDURE — 99284 EMERGENCY DEPT VISIT MOD MDM: CPT

## 2023-03-26 PROCEDURE — 99285 EMERGENCY DEPT VISIT HI MDM: CPT | Mod: 25

## 2023-03-26 PROCEDURE — 85610 PROTHROMBIN TIME: CPT

## 2023-03-26 PROCEDURE — 80053 COMPREHEN METABOLIC PANEL: CPT

## 2023-03-26 RX ORDER — SODIUM CHLORIDE 9 MG/ML
500 INJECTION INTRAMUSCULAR; INTRAVENOUS; SUBCUTANEOUS ONCE
Refills: 0 | Status: COMPLETED | OUTPATIENT
Start: 2023-03-26 | End: 2023-03-26

## 2023-03-26 RX ADMIN — SODIUM CHLORIDE 500 MILLILITER(S): 9 INJECTION INTRAMUSCULAR; INTRAVENOUS; SUBCUTANEOUS at 21:47

## 2023-03-26 NOTE — ED ADULT NURSE NOTE - OBJECTIVE STATEMENT
Pt came in to ED today c/o headahce/neck pain s/p head injury. Pt sates he was cutting down tree branches at his home when a large branch fell on his head Pt has visible scratches on left side of the top of his head. Pt denies n/v/d, dizziness, SOB, CP, LOC. Pt ambulatory s/p head injury.

## 2023-03-26 NOTE — ED STATDOCS - TEST CONSIDERED BUT NOT PERFORMED
Tests Considered But Not Performed MR of neck, however patient has no focal neurological deficits, unable to perform MR in ED as per hospital policy unless evidence of cord injury (which patient does not exhibit now including no saddle anesthesia, no incontinence of urine or stool, no paresthesia or focal neuro deficits).

## 2023-03-26 NOTE — ED STATDOCS - CLINICAL SUMMARY MEDICAL DECISION MAKING FREE TEXT BOX
Plan: CT imaging, labs, and reassess. Plan: CT imaging, labs, and reassess.    s/p closed head injury with branch hitting patient on the head, no loc, low distance travel/low force impact, r/o intracranial injury or fx, all CTs discussed with patient including all findings as well incidental findings. No acute neurological findings. Patient is ambulatory. Tolerating PO. No saddle anesthesia. Patient and son with whom patient is present and who patient consents to be involved in care, are aware that while CT imaging is an ideal emergent imaging that it can not diagnose disc herniations, or can at times miss occult fractures, to see primary care, strict return precautions if any concussive symptoms and to potentially follow up with ortho spine or primary to consider MR.

## 2023-03-26 NOTE — ED STATDOCS - DIFFERENTIAL DIAGNOSIS
s/p closed head injury with branch hitting patient on the head, no loc, low distance travel/low force impact, r/o intracranial injury or fx, all CTs discussed with patient including all findings as well incidental findings (hiatal hernia etc, and follow up with GI) . No acute neurological findings. Patient is ambulatory. Tolerating PO. No saddle anesthesia. Patient and son with whom patient is present and who patient consents to be involved in care, are aware that while CT imaging is an ideal emergent imaging that it can not diagnose disc herniations, or can at times miss occult fractures, to see primary care, strict return precautions if any concussive symptoms and to potentially follow up with ortho spine or primary to consider MR. Differential Diagnosis

## 2023-03-26 NOTE — ED STATDOCS - OBJECTIVE STATEMENT
67 year old male presents to the ED complaining of headache and neck pain s/p head injury. Pt relates that he was cutting a branch on his property when a large branch fell on top of him, hitting him on his left forehead. Denies LOC. No other complaints at this time. 67 year old male presents to the ED complaining of headache and trapezial pain s/p head injury. Pt relates that he was cutting a branch on his property when a large branch fell on top of him, hitting him on his left forehead. Denies LOC. No other complaints at this time including no chest pain, shortness of breath, palpitation, abdominal pain, fever, chills, paresthesias of arms or legs, difficulty ambulating, saddle anesthesia or incontinence of urine. No melena or hematochezia. No dysuria hematuria or frequency. No motor weakness of arms or legs. No visual complaints. No epistaxis. States he is here because family wanted him to get an imaging of his head after the trauma.

## 2023-03-26 NOTE — ED ADULT TRIAGE NOTE - CHIEF COMPLAINT QUOTE
Pt ambulatory to triage, c/o head injury 15 min PTA. Pt states he was cutting down a big branch and it hit him in the head when coming down and made him fall to the ground. Pt offers no complaints. Denies LOC, anticoagulant use, N/V, and dizziness. Noted abrasions to the L forehead.

## 2023-03-26 NOTE — ED STATDOCS - CARE PROVIDER_API CALL
Willam Stiles (MD)  Gastroenterology; Internal Medicine  [address] [facility], [place] [address]  Phone: [phone]  Fax: [phone]  Follow Up Time:

## 2023-03-26 NOTE — ED STATDOCS - NSFOLLOWUPINSTRUCTIONS_ED_ALL_ED_FT
Please note that I have discussed with you all the results of your labs and imaging. Please note that I have provided you with the hard copies of your labs and imaging including all incidental findings which in this case includes findings of a 'hiatal hernia' and also 'diverticulosis.' Please make sure you follow up with a gastroenterologist as soon as possible. If you have any fever, chills, abdomina pain return to us immediately. Please note that as per radiology report I do not see any evidence of any acute traumatic injury in your head, neck chest or abdomen however as discussed CAT scan imaging is not always able to see all pathology however in the context of trauma it suffices, so please make sure you follow up with your primary care physician and return to us immediately if you have any nausea, vomiting, blurry vision or other signs of concussion.

## 2023-03-26 NOTE — ED ADULT NURSE NOTE - CHIEF COMPLAINT QUOTE
Pt ambulatory to triage, c/o head injury 15 min PTA. Pt states he was cutting down a big branch and it hit him in the head when coming down and made him fall to the ground. Pt offers no complaints. Denies LOC, anticoagulant use, N/V, and dizziness. Noted abrasions to the L forehead. Yes

## 2023-03-26 NOTE — ED STATDOCS - MUSCULOSKELETAL, MLM
range of motion is not limited and there is no muscle tenderness. range of motion is not limited and there is no muscle tenderness. 5/5 strength on flexion and extension of all limbs. No nuchal rigidity. No saddle anesthesia. No step off on palpation of spine. no laxity of any joints. 5/5 strength on flexion and extension of all limbs.

## 2023-03-26 NOTE — ED STATDOCS - NS_ ATTENDINGSCRIBEDETAILS _ED_A_ED_FT
I Saturnino Arthur MD saw and examined the patient. Scribe documented for me and under my supervision. I have modified the scribe's documentation where necessary to reflect my history, physical exam and other relevant documentations pertinent to the care of the patient.

## 2023-03-26 NOTE — ED STATDOCS - SKIN, MLM
2x2 inch hematoma left forehead. 2x2 inch hematoma left forehead. No bugginess or give on palpation. No evidence of depressed skull fx. No lacerations. No facial lacerations or hematoma.

## 2023-03-26 NOTE — ED STATDOCS - PATIENT PORTAL LINK FT
You can access the FollowMyHealth Patient Portal offered by [facility] by registering at the following website: [email or web address]. By joining [employer]’s FollowMyHealth portal, you will also be able to view your health information using other applications (apps) compatible with our system.

## 2023-03-26 NOTE — ED STATDOCS - NSICDXPASTMEDICALHX_GEN_ALL_CORE_FT
PAST MEDICAL HISTORY:  COVID-19 vaccine series completed     Gastric ulcer as child    Hypertension     Morbid obesity with BMI of 40.0-44.9, adult     ALISTAIR on CPAP     Osteoarthritis     Osteoarthritis of left knee     Schatzki's ring endoscopy every 3 years and stricture is "stretched", last 2021

## 2023-03-27 VITALS
RESPIRATION RATE: 18 BRPM | DIASTOLIC BLOOD PRESSURE: 95 MMHG | HEART RATE: 61 BPM | OXYGEN SATURATION: 97 % | TEMPERATURE: 98 F | SYSTOLIC BLOOD PRESSURE: 151 MMHG

## 2023-09-12 NOTE — BRIEF OPERATIVE NOTE - ESTIMATED BLOOD LOSS
100 Detail Level: Detailed Prescription Strength Graduated Compression Stockings Recommendations: The patient was counseled that prescription strength graduated compression stockings should be worn for all waking hours. They will follow up with a venous specialist to monitor graduated compression stocking usage and their symptoms.

## 2023-09-21 ASSESSMENT — KOOS JR
GOING UP OR DOWN STAIRS: EXTREME
STANDING UPRIGHT: EXTREME
STRAIGHTENING KNEE FULLY: MODERATE
KOOS JR RAW SCORE: 22
HOW SEVERE IS YOUR KNEE STIFFNESS AFTER FIRST WAKING IN MORNING: SEVERE
IMPORTED FORM: YES
BENDING TO THE FLOOR TO PICK UP OBJECT: SEVERE
IMPORTED KOOS JR SCORE: 22.0
TWISING OR PIVOTING ON KNEE: SEVERE
RISING FROM SITTING: SEVERE

## 2023-10-05 ENCOUNTER — OFFICE (OUTPATIENT)
Dept: URBAN - METROPOLITAN AREA CLINIC 100 | Facility: CLINIC | Age: 68
Setting detail: OPHTHALMOLOGY
End: 2023-10-05
Payer: MEDICARE

## 2023-10-05 DIAGNOSIS — Z96.1: ICD-10-CM

## 2023-10-05 DIAGNOSIS — H35.372: ICD-10-CM

## 2023-10-05 DIAGNOSIS — H35.413: ICD-10-CM

## 2023-10-05 DIAGNOSIS — H43.813: ICD-10-CM

## 2023-10-05 DIAGNOSIS — H43.393: ICD-10-CM

## 2023-10-05 DIAGNOSIS — H26.493: ICD-10-CM

## 2023-10-05 PROCEDURE — 92014 COMPRE OPH EXAM EST PT 1/>: CPT | Performed by: OPHTHALMOLOGY

## 2023-10-05 ASSESSMENT — REFRACTION_CURRENTRX
OS_ADD: +2.00
OS_OVR_VA: 20/
OD_VPRISM_DIRECTION: SV
OD_OVR_VA: 20/
OS_VPRISM_DIRECTION: SV
OD_ADD: +2.00

## 2023-10-05 ASSESSMENT — REFRACTION_MANIFEST
OD_CYLINDER: SPHR
OD_ADD: +2.25
OD_VA1: 20/25-1
OS_CYLINDER: SPHR
OU_VA: 20/20
OD_SPHERE: PLANO
OS_VA1: 20/20-2
OS_SPHERE: -0.25
OS_ADD: +2.25

## 2023-10-05 ASSESSMENT — REFRACTION_AUTOREFRACTION
OS_CYLINDER: SPHR
OD_CYLINDER: SPHR
OS_SPHERE: -0.25
OD_SPHERE: PLANO

## 2023-10-05 ASSESSMENT — KERATOMETRY
OD_AXISANGLE_DEGREES: 073
OD_K1POWER_DIOPTERS: 43.50
OS_K2POWER_DIOPTERS: 44.00
OS_K1POWER_DIOPTERS: 43.50
OS_AXISANGLE_DEGREES: 089
OD_K2POWER_DIOPTERS: 44.00

## 2023-10-05 ASSESSMENT — TONOMETRY
OD_IOP_MMHG: 17
OS_IOP_MMHG: 14

## 2023-10-05 ASSESSMENT — VISUAL ACUITY
OD_BCVA: 20/25-
OS_BCVA: 20/25-

## 2023-10-05 ASSESSMENT — CONFRONTATIONAL VISUAL FIELD TEST (CVF)
OS_FINDINGS: FULL
OD_FINDINGS: FULL

## 2023-12-09 NOTE — DISCHARGE NOTE PROVIDER - NSDCQMSTAIRS_GEN_ALL_CORE
Pt was found walking the halls of the ED. Discharge paperwork reviewed w pt. Pt shown where the waiting room is.   Yes

## 2023-12-18 NOTE — ED STATDOCS - ENMT NEGATIVE STATEMENT, MLM
66
Ears: no ear pain and no hearing problems. Nose: no nasal congestion and no nasal drainage. Mouth/Throat: no dysphagia, no hoarseness and no throat pain. Neck: no lumps, no pain, no stiffness and no swollen glands.

## 2024-02-01 NOTE — H&P PST ADULT - SKIN
In summary,     79 year olf F with PMH of GERD, R CEA, HTN, severe osteoporosis and Sjogren's Syndrome diagnosed many years ago (NUHA, SS A 114, RF 27) manifesting primarily has dry eye and dry mouth. She is established with ophthalmologist who is prescribing artifical tears, xiidra. She has ongoing dry mouth and has yet to establish with a dentist. Denies history of inflammatory arthritis, pleurisy, effusions, blood clots, miscarriages, cytopenias or renal insufficiency. There has been some mention of a sjogren's related pulmonary problem but no notes. She is established with pulmonary at this time and has pending imaging.     Regarding Sjogren's:  For dry eye continue artificial tears, xiidra, ophthalmology follow up   For dry mouth needs biannual dental examinations, fluoride rinse, biotene mouthwash  Obtain disease activity markers  Continue  mg qday.   Monitor for skin rashes.     Sjogren's related interstitial lung disease is generally made in the context of a sjogren's diagnosis and an ILD which is commonly associated with the condition such as lymphoid interstitial pneumonia and potentially others so it is helpful to characterize the ILD to see if is one typically associated with the condition. As such will follow upcoming CT Chest and obtain outside hospital records from [facility].     Regarding osteoporosis - patient defers anabolic medication in favor of prolia, will facilitate.    No lesions; no rash

## 2024-03-01 NOTE — ED STATDOCS - PRINCIPAL DIAGNOSIS
"AMBULATORY CASE MANAGEMENT NOTE    Name and Relationship of Patient/Support Person: Amarilis Vinson A - Self    Patient Outreach    Patient reports on her DM if she runs out of food and she eats bad foods because they cannot afford. Hard to get medications, \"I will pass on Ozempic or I will pass on Dexcom due to cost.\"     We were discussion getting [place] Medicaid and the phone was disconnected and I attempted to call her back and went to voicemail but reports the person you called does not have voicemail set up yet.     Will try to outreach again next week. I will place referral for MSW for support on food no and help with getting insurance. She reports he owes the office and [employer] a lot of money right now and she is still on her mother's insurance plan. We discussed getting a referral back to the DM clinic in [place] as well, but at times transportation can be an issue.     Patient returns call and discuss medication management of Ozempic and health care plans. Wants to defer on DM educator now due to cost of out of pocket from insurance. Wants to inform PCP her Farxiga says if history of Liver issues do not use and she does have elevated liver enzymes. She is not taking it. Her Ozempic is at 0.25 mg and has never been told to increase to the 0.5mg or go up to the 1 mg or 2 mg doses. She also reports her insulin is a $30 copay and she is not able to probably get it refilled.             Stacie ROSS  Ambulatory Case Management    3/1/2024, 15:19 EST  " Closed head injury

## 2024-06-06 ENCOUNTER — OUTPATIENT (OUTPATIENT)
Dept: OUTPATIENT SERVICES | Facility: HOSPITAL | Age: 69
LOS: 1 days | Discharge: HOME | End: 2024-06-06
Payer: MEDICARE

## 2024-06-06 VITALS
RESPIRATION RATE: 19 BRPM | HEART RATE: 65 BPM | WEIGHT: 294.98 LBS | TEMPERATURE: 98 F | HEIGHT: 70 IN | OXYGEN SATURATION: 98 % | DIASTOLIC BLOOD PRESSURE: 73 MMHG | SYSTOLIC BLOOD PRESSURE: 137 MMHG

## 2024-06-06 DIAGNOSIS — Z98.890 OTHER SPECIFIED POSTPROCEDURAL STATES: Chronic | ICD-10-CM

## 2024-06-06 DIAGNOSIS — Z96.651 PRESENCE OF RIGHT ARTIFICIAL KNEE JOINT: Chronic | ICD-10-CM

## 2024-06-06 DIAGNOSIS — R13.19 OTHER DYSPHAGIA: ICD-10-CM

## 2024-06-06 DIAGNOSIS — Z90.49 ACQUIRED ABSENCE OF OTHER SPECIFIED PARTS OF DIGESTIVE TRACT: Chronic | ICD-10-CM

## 2024-06-06 DIAGNOSIS — Z98.49 CATARACT EXTRACTION STATUS, UNSPECIFIED EYE: Chronic | ICD-10-CM

## 2024-06-06 DIAGNOSIS — Z96.659 PRESENCE OF UNSPECIFIED ARTIFICIAL KNEE JOINT: Chronic | ICD-10-CM

## 2024-06-06 DIAGNOSIS — D17.79 BENIGN LIPOMATOUS NEOPLASM OF OTHER SITES: Chronic | ICD-10-CM

## 2024-06-06 DIAGNOSIS — Z86.010 PERSONAL HISTORY OF COLONIC POLYPS: ICD-10-CM

## 2024-06-06 PROCEDURE — 88305 TISSUE EXAM BY PATHOLOGIST: CPT

## 2024-06-06 PROCEDURE — 88312 SPECIAL STAINS GROUP 1: CPT | Mod: 26

## 2024-06-06 PROCEDURE — 88305 TISSUE EXAM BY PATHOLOGIST: CPT | Mod: 26

## 2024-06-06 PROCEDURE — C1726: CPT

## 2024-06-06 PROCEDURE — 88313 SPECIAL STAINS GROUP 2: CPT

## 2024-06-06 PROCEDURE — 88313 SPECIAL STAINS GROUP 2: CPT | Mod: 26

## 2024-06-06 PROCEDURE — 88312 SPECIAL STAINS GROUP 1: CPT

## 2024-06-06 RX ORDER — IRBESARTAN 75 MG/1
1 TABLET ORAL
Refills: 0 | DISCHARGE

## 2024-06-06 RX ORDER — AMLODIPINE BESYLATE 2.5 MG/1
1 TABLET ORAL
Qty: 0 | Refills: 0 | DISCHARGE

## 2024-06-06 RX ORDER — ROSUVASTATIN CALCIUM 5 MG/1
1 TABLET ORAL
Refills: 0 | DISCHARGE

## 2024-06-06 RX ORDER — TAMSULOSIN HYDROCHLORIDE 0.4 MG/1
1 CAPSULE ORAL
Refills: 0 | DISCHARGE

## 2024-06-06 NOTE — ASU PATIENT PROFILE, ADULT - FALL HARM RISK - UNIVERSAL INTERVENTIONS
Bed in lowest position, wheels locked, appropriate side rails in place/Call bell, personal items and telephone in reach/Instruct patient to call for assistance before getting out of bed or chair/Non-slip footwear when patient is out of bed/[place] to call system/Physically safe environment - no spills, clutter or unnecessary equipment/Purposeful Proactive Rounding/Room/bathroom lighting operational, light cord in reach

## 2024-06-06 NOTE — ASU PATIENT PROFILE, ADULT - FALL HARM RISK - PT AGE POPULATION HIDDEN
Adult Follow up with your primary care provider within 1 week, discuss your home medications  Follow up with neurology within 1 week, referral list provided  Take Prednisone 40mg (2 tablets) daily  Take Valacyclovir 1g (1 tablet) three times a day for 10 days  Use artificial tears in your right eye, at night use erythromycin ointment and wear an eye patch   Return to the ER with any worsening or concerning symptoms, difficulty speaking or swallowing, weakness, numbness, headache or any other concerns. Follow up with your primary care provider within 1 week, discuss your home medications  Follow up with neurology within 1 week, referral list provided  Follow up with your ophthalmologist within 1 week   Take Prednisone 40mg (2 tablets) daily  Take Valacyclovir 1g (1 tablet) three times a day for 10 days  Use artificial tears in your right eye, at night use erythromycin ointment and wear an eye patch   Return to the ER with any worsening or concerning symptoms, difficulty speaking or swallowing, weakness, numbness, headache or any other concerns.

## 2024-06-06 NOTE — ASU PATIENT PROFILE, ADULT - NSICDXPASTSURGICALHX_GEN_ALL_CORE_FT
PAST SURGICAL HISTORY:  H/O arthroscopy of left knee 2018    H/O cataract extraction bilateral 2019    H/O inguinal hernia repair bilateral with umbilical hernia repair 2001    History of arthroplasty of right knee 3/2022    History of knee joint replacement     Lipoma of hand 2017    S/P laparoscopic cholecystectomy 2014     PAST SURGICAL HISTORY:  H/O arthroscopy of left knee 2018    H/O cataract extraction bilateral 2019    H/O inguinal hernia repair bilateral with umbilical hernia repair 2001    History of arthroplasty of right knee 3/2022    History of knee joint replacement bilaterally    Lipoma of hand 2017    S/P laparoscopic cholecystectomy 2014

## 2024-06-11 LAB — SURGICAL PATHOLOGY STUDY: SIGNIFICANT CHANGE UP

## 2024-06-12 DIAGNOSIS — K64.0 FIRST DEGREE HEMORRHOIDS: ICD-10-CM

## 2024-06-12 DIAGNOSIS — R13.10 DYSPHAGIA, UNSPECIFIED: ICD-10-CM

## 2024-06-12 DIAGNOSIS — Z79.82 LONG TERM (CURRENT) USE OF ASPIRIN: ICD-10-CM

## 2024-06-12 DIAGNOSIS — K22.2 ESOPHAGEAL OBSTRUCTION: ICD-10-CM

## 2024-06-12 DIAGNOSIS — I25.10 ATHEROSCLEROTIC HEART DISEASE OF NATIVE CORONARY ARTERY WITHOUT ANGINA PECTORIS: ICD-10-CM

## 2024-06-12 DIAGNOSIS — I10 ESSENTIAL (PRIMARY) HYPERTENSION: ICD-10-CM

## 2024-06-12 DIAGNOSIS — Z86.010 PERSONAL HISTORY OF COLONIC POLYPS: ICD-10-CM

## 2024-06-12 DIAGNOSIS — Z12.11 ENCOUNTER FOR SCREENING FOR MALIGNANT NEOPLASM OF COLON: ICD-10-CM

## 2024-06-12 DIAGNOSIS — K44.9 DIAPHRAGMATIC HERNIA WITHOUT OBSTRUCTION OR GANGRENE: ICD-10-CM

## 2024-06-12 DIAGNOSIS — K21.9 GASTRO-ESOPHAGEAL REFLUX DISEASE WITHOUT ESOPHAGITIS: ICD-10-CM

## 2024-06-12 DIAGNOSIS — G47.33 OBSTRUCTIVE SLEEP APNEA (ADULT) (PEDIATRIC): ICD-10-CM

## 2024-06-12 DIAGNOSIS — K29.50 UNSPECIFIED CHRONIC GASTRITIS WITHOUT BLEEDING: ICD-10-CM

## 2024-06-12 DIAGNOSIS — M06.9 RHEUMATOID ARTHRITIS, UNSPECIFIED: ICD-10-CM

## 2024-06-12 DIAGNOSIS — K31.89 OTHER DISEASES OF STOMACH AND DUODENUM: ICD-10-CM

## 2024-06-12 DIAGNOSIS — E66.01 MORBID (SEVERE) OBESITY DUE TO EXCESS CALORIES: ICD-10-CM

## 2024-06-12 NOTE — OCCUPATIONAL THERAPY INITIAL EVALUATION ADULT - PHYSICAL ASSIST/NONPHYSICAL ASSIST: STAND/SIT, REHAB EVAL
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    CO MORBIDITIES:      Past Medical History:   Diagnosis Date    Cancer ([facility]     melanoma    Cancer ([facility]     prostate, and colon    Diabetes ([facility]     High blood pressure     Hyperlipidemia     Kidney stone      Past Surgical History:   Procedure Laterality Date    COLECTOMY      COLECTOMY N/A 2021    PARTIAL PROCTECTOMY DIVERTING LOOP ILEOSTOMY EPIDURAL performed by Agus Ellis MD at [facility] 167 N/A 2019    COLONOSCOPY WITH STENT performed by Belén Garduno MD at [place] [address] N/A 2020    COLONOSCOPY WITH BIOPSY performed by Belén Garduno MD at [facility] N/A 2021    FLEXIBLE SIGMOIDOSCOPY WITH TATTOO performed by Agus Ellis MD at [facility] N/A 2021    COLONOSCOPY performed by Agus Ellis MD at [place] [address] N/A 2021    ILEOSTOMY REVERSAL performed by Agus Ellis MD at [address] [place] [address] 10/12/2021    abdominal fluid collection aspiration by Dr. Cal Collier Right 2019    REPAIR OF RIGHT INGUINAL HERNIA WITH MESH performed by Jonathan Beltran MD at [address] [place] [address] N/A 2021    INSERTION OF SUBCUTANEOUS VENOUS PORT performed by Jonathan Beltran MD at [address] [place] [address]  10/27/2009    SMALL INTESTINE SURGERY N/A 2019    BOWEL RESECTION SIGMOID COLECTOMY,  COLOSTOMY performed by Jonathan Beltran MD at [place] [address] 2019    REVERSAL OF PARIKH PROCEDURE, LYSIS OF ADHESIONS performed by Jonathan Beltran MD at [facility] Instructions: This plan will send the code FBSE to the PM system.  DO NOT or CHANGE the price. N/A 10/20/2021    EGD DIAGNOSTIC ONLY performed by Miya Torres MD at [facility]        Restrictions  Restrictions/Precautions: Isolation, Fall Risk  Position Activity Restriction  Other position/activity restrictions: neutropenic isolation  CASE MANAGEMENT    Social/Functional History  Social/Functional History  Lives With: Spouse  Type of Home: House  Home Layout: Two level, Laundry in basement, Able to Live on Main level with bedroom/bathroom  Home Access: Stairs to enter with rails  Entrance Stairs - Number of Steps: 1 + 3  Entrance Stairs - Rails: Both  Bathroom Shower/Tub: Tub/Shower unit, Curtain  Bathroom Toilet: Standard  Bathroom Equipment: Grab bars in shower, Hand-held shower, Tub transfer bench  Bathroom Accessibility: Walker accessible  Home Equipment: Chaim Tomyos, rolling, Lift chair  Has the patient had two or more falls in the past year or any fall with injury in the past year?: No  Receives Help From: Family  ADL Assistance: Needs assistance  Bath: Independent  Dressing: Independent  Grooming: Independent  Feeding: Independent  Toileting:  (Occasional assist for pants management)  Homemaking Assistance: Needs assistance  Meal Prep Responsibility: No  Laundry Responsibility: Secondary  Cleaning Responsibility: No  Bill Paying/Finance Responsibility: No  Shopping Responsibility: No  Health Care Management: No  Ambulation Assistance: Needs assistance  Transfer Assistance: Needs assistance  Active : No  Patient's  Info: Wife drives  Mode of Transportation: SUV  Education: 12th grade--did not graduate  Occupation: Retired  Type of Occupation: Worked at [employer] 53: \"I don't know now\" Patient reported that he used to hunt and fish. No pets at home.   IADL Comments: Per patient his wife manages medications and finances  Additional Comments: Not left alone and pts spouse indicates someone is SBA with all mobility at home       Pts personal preferences: n/a    Pts Price (Do Not Change): 0.00 Detail Level: Simple assets/resources/support system: wife, children, grandchildren    COVERAGE INFORMATION:Payor: MEDICARE / Plan: MEDICARE PART A AND B / Product Type: *No Product type* /       NURSING  Neutropenic    Weight: 169 lb 1.5 oz (76.7 kg) / Body mass index is 27.31 kg/m². ADULT DIET; Regular; Low Microbial    SpO2: 95 % (01/12/23 0804)    Oxygen to be continued upon discharge: N/A  Home-going needs (nocturnal Pox, sleep study, RX, equipment): No    Skin Issues: No; bottom and groin are red  Home-going needs (education, training, RX, Wound RN): Yes    Pain Managed: Yes  Pt denies any pain    Bladder continence: No incontinent at times  Discharging with Arredondo: N/A   Training done: N/A   Urology following: No   Plan/date to remove arredondo: N/A   Bladder retraining started: No    Bowel continence:  No at times incontinent  Last BM date: 1/12  Need for bowel program: No    Anticoagulants: none  Home-going needs (Education, labs):  Yes      Antibiotics: Augmentin  Stop date: 1/24 @ 2059  Home-going needs (education, RX, PICC): Yes  Pt training    Other:       PHYSICAL THERAPY  Bed mobility:  Bed mobility  Rolling to Left: Stand by assistance (01/11/23 1024)  Supine to Sit: Stand by assistance (01/11/23 1024)  Sit to Supine: Stand by assistance (01/11/23 1024)  Scooting: Stand by assistance (01/11/23 1024)  Bed Mobility Comments: HOB flat with no rails - cues for safety and technique intermittently required - increased time required (01/11/23 1024)  Sit to Supine  Assistance Level: Contact guard assist (01/11/23 1201)  Scooting  Assistance Level: Contact guard assist (01/11/23 1201)  Transfers:  Bed mobility  Rolling to Left: Stand by assistance (01/11/23 1024)  Supine to Sit: Stand by assistance (01/11/23 1024)  Sit to Supine: Stand by assistance (01/11/23 1024)  Scooting: Stand by assistance (01/11/23 1024)  Bed Mobility Comments: HOB flat with no rails - cues for safety and technique intermittently required - increased time required (01/11/23 1024)  Sit to Supine  Assistance Level: Contact guard assist (01/11/23 1201)  Scooting  Assistance Level: Contact guard assist (01/11/23 1201)  Gait:   Ambulation  Surface: Level tile (01/11/23 1025)  Device: [facility]el [facility] [employer] (01/11/23 1025)  Assistance: Contact guard assistance;Minimal assistance (01/11/23 1025)  Quality of Gait: flexed forward posture with downward gaze, decreased bilateral step length and foot clearance, increased balance and postural deficits upon fatigue, decreased walker safety with change of direction (01/11/23 1025)  Distance: 15 feet; 40 feet (01/11/23 1025)  Comments: Distance limited by fatigue (01/11/23 1025)  Ambulation  Surface: Level surface (01/11/23 1623)  Device: Rolling walker (01/11/23 1623)  Distance: 30' x 2 (01/11/23 1623)  Assistance Level: Contact guard assist;Stand by assist (01/11/23 1623)  Gait Deviations: Slow nick;Decreased step length bilateral;Wide base of support;Decreased trunk rotation (01/11/23 1623)  Skilled Clinical Factors: ff posture (01/11/23 1623)  Stairs:  Stairs/Curb  Stairs?: No (01/11/23 1025)  W/C mobility:  Wheelchair Activities  Propulsion: No (01/11/23 1025)  LTG:  Long Term Goal 1: indep and effecient bed mobility  Long Term Goal 2: SBA sit to stand with ww  Long Term Goal 3: SBA gait 50 -1 50 feet with SBA  Long Term Goal 4: SBA 4 stairs with rails  Long Term Goal 5: 25/56 for Brown balance  PT Treatment Time:  1.5 hrs      OCCUPATIONAL THERAPY    EVALUATION SELF CARE STATUS:  Hand Dominance: Right  Feeding: Supervision (01/11/23 1103)  Grooming: Supervision;Verbal cueing (01/11/23 1103)  UE Bathing: Stand by assistance; Increased time to complete (01/11/23 1103)  LE Bathing: Minimal assistance; Increased time to complete (01/11/23 1103)  UE Dressing: Supervision; Increased time to complete (01/11/23 1103)  LE Dressing: Maximum assistance (01/11/23 1103)  LE Dressing Skilled Clinical Factors: Patient needed assist to thread the left leg into both the Depends and the pants as well as assist to don his socks and shoes (01/11/23 1103)  Toileting: Moderate assistance (01/11/23 1103)             CURRENT SELF CARE:  Feeding  Assistance Level: Supervision (01/12/23 0915)  Grooming/Oral Hygiene  Assistance Level: Supervision (01/12/23 0915)  Upper Extremity Bathing  Assistance Level: Stand by assist (01/12/23 0915)  Skilled Clinical Factors: s/u provided (01/12/23 0915)  Lower Extremity Bathing  Assistance Level: Supervision (01/12/23 0915)  Skilled Clinical Factors: s/u provided (01/12/23 0915)  Upper Extremity Dressing  Assistance Level: Supervision (01/12/23 0915)  Lower Extremity Dressing  Assistance Level: Stand by assist (01/12/23 0915)  Putting On/Taking Off Footwear  Assistance Level: Minimal assistance (01/12/23 0915)  Toileting  Assistance Level: Stand by assist (01/12/23 0915)  Toilet Transfers  Technique: Stand step (01/12/23 0915)  Equipment: Standard toilet (01/12/23 0915)  Additional Factors: With handrails (01/12/23 0915)  Assistance Level: Stand by assist (01/12/23 0915)  Tub/Shower Transfers  Skilled Clinical Factors: sponge bath (01/12/23 0915)        LTG:  Eating:Discharge Goal: Independent  Oral Hygiene:Discharge Goal: Independent  Shower/Bathe self: Discharge Goal: Supervision or touching assistance  Upper body dressing:Discharge Goal: Independent  Lower body dressing:Discharge Goal: Supervision or touching assistance  Putting on taking off footwear:Discharge Goal: Partial/moderate assistance   Toileting: Discharge Goal: Supervision or touching assistance  Toilet transfer:Discharge Goal: Supervision or touching assistance  OT Treatment Time: 1.5 hrs      SPEECH THERAPY       Comprehension:  (Moderate auditory comprehension deficits with greater than 2 step directions. Recommend 1-2 step directions with repetition.)  Verbal Expression:  (Mild to moderate abstract divergent and convergent naming deficits.  Impaired thought organization was noted. Provide extra time.)      Diet/Swallow:        Dysphagia Outcome Severity Scale: Level 6: Within functional limits/Modified independence    Compensatory Swallowing Strategies : Upright as possible for all oral intake         LTG:  Long Term Goals  Time Frame for Long Term Goals: 2-3 weeks  Goal 1: Pt will improve his/her cognitive linguistic abilities to a min to supervised assist level (from Moderate level) in order to safely complete ADLs in all opportunities. Long-term Goals  Timeframe for Long-term Goals: N/A          COGNITION  OT: Cognition Comment: Patient was noted to have decreased safety during ambulation and stand to sit with patient leaving the walker too early and not getting completely lined up with with the commode or chair when sitting  SP:        RECREATIONAL THERAPY  Attendance to recreational therapy programs:    []  Pet Therapy  [] Music Therapy  [] Art Therapy    [] Recreation Therapy Group [] Support Group           Patient social interaction (mood, participation): good participation    Patient strengths: wife supportive    Patients goal: return home w/ wife    Problems/Barriers: mild to moderate cognitive deficits (baseline), delayed responses        1. Safety:          - Intervention / Plan:    [x]  falls protocol     [x]  PT/OT    [x]  SP        - Results:         2. Potential DME needs:         - Intervention / Plan:  [x]  PT/OT     [x]  Assess equipment needs/access       - Results:         3. Weakness:          - Intervention / Plan:  [x]  PT/OT      []  Other:         - Results:         4.   Discharge planning needs:          - Intervention / Plan:  [x]  Weekly team conference      [x]  family training        - Results:         5.            - Intervention / Plan:          - Results:         6.            - Intervention / Plan:         - Results:         7.            - Intervention / Plan:         - Results:           Discharge Plan   Estimated Length of Stay: TBD    Tentative Discharge date: 1/20/23      Anticipated Discharge Destination:  Home      Team recommendations:    1. Follow up Therapy :    PT  OT  SLP  RN  [facility]    2. Home Health    Other:     Equipment needed at Discharge:  Other: TBD      Team Members Present at Conference:    Physician: Dr. Columba Javier Worker: GUNNAR Metzger, DARLING  RN: Vanessa Nam RN  Physical Therapist: Tanja Butts, MICHEAL  Occupational Therapist: Malka Capellan OTR  Speech Therapist: PRINCESS Branch      Electronically signed by GUNNAR Metzger LSW on 1/12/2023 at 9:37 AM verbal cues

## 2024-06-18 NOTE — DISCHARGE NOTE PROVIDER - NSDCADMDATE_GEN_ALL_CORE_FT
Detail Level: Detailed
Depth Of Biopsy: dermis
Was A Bandage Applied: Yes
Size Of Lesion In Cm: 0
Biopsy Type: H and E
Biopsy Method: Dermablade
Anesthesia Type: 1% lidocaine with epinephrine
Anesthesia Volume In Cc: 0.5
Hemostasis: Drysol
Wound Care: Petrolatum
Dressing: bandage
Destruction After The Procedure: No
Type Of Destruction Used: Curettage
Curettage Text: The wound bed was treated with curettage after the biopsy was performed.
Cryotherapy Text: The wound bed was treated with cryotherapy after the biopsy was performed.
Electrodesiccation Text: The wound bed was treated with electrodesiccation after the biopsy was performed.
Electrodesiccation And Curettage Text: The wound bed was treated with electrodesiccation and curettage after the biopsy was performed.
04-Aug-2022 08:15
Silver Nitrate Text: The wound bed was treated with silver nitrate after the biopsy was performed.
Lab: [id number]
Lab Facility: 
Consent: Written consent was obtained and risks were reviewed including but not limited to scarring, infection, bleeding, scabbing, incomplete removal, nerve damage and allergy to anesthesia.
Post-Care Instructions: I reviewed with the patient in detail post-care instructions. Patient is to keep the biopsy site dry overnight, and then apply bacitracin twice daily until healed. Patient may apply hydrogen peroxide soaks to remove any crusting.
Notification Instructions: Patient will be notified of biopsy results. However, patient instructed to call the office if not contacted within 2 weeks.
Billing Type: Third-Party Bill
Information: Selecting Yes will display possible errors in your note based on the variables you have selected. This validation is only offered as a suggestion for you. PLEASE NOTE THAT THE VALIDATION TEXT WILL BE REMOVED WHEN YOU FINALIZE YOUR NOTE. IF YOU WANT TO FAX A PRELIMINARY NOTE YOU WILL NEED TO TOGGLE THIS TO 'NO' IF YOU DO NOT WANT IT IN YOUR FAXED NOTE.

## 2024-07-07 ENCOUNTER — NON-APPOINTMENT (OUTPATIENT)
Age: 69
End: 2024-07-07

## 2024-07-13 ENCOUNTER — NON-APPOINTMENT (OUTPATIENT)
Age: 69
End: 2024-07-13

## 2024-07-25 NOTE — PHYSICAL THERAPY INITIAL EVALUATION ADULT - ADDITIONAL COMMENTS
No indicators present
pvt home 3 TIMOTHY w/o HR and 2 steps w/o HR +13 steps w/ HR. Pt drives. Pt's spouse retired and able to ast. Pt has hospital bed setup in LR and plans to stay on main floor w/ bathroom

## 2024-08-22 NOTE — DISCHARGE NOTE PROVIDER - NSDCCPCAREPLAN_GEN_ALL_CORE_FT
SW met with pt in infusion per request to assist with psychosocial needs. Pt communicated her FMLA and insurance will lapse in October, and she does not plan to return to work. Pt asked about insurance options other than COBRA. Pt reported her COBRA plan does not cover medication assistance. SW provided education on [place] marketplace plans, and encouraged her to look into those. SW provided contact information for additional support and assistance if needed. Pt thanked MIYA and had no other needs.   PRINCIPAL DISCHARGE DIAGNOSIS  Diagnosis: Primary osteoarthritis of right knee  Assessment and Plan of Treatment: For your total knee replacement:  Physical Therapy/Occupational Therapy for: ambulation, transfers, stairs, ADL's (activities of daily living), range of motion exercises, and isometrics  -Activity  • Weight Bearing as tolerated with rolling walker.  • Cryo/cuff 20 minutes several times daily with at least a 1 hour break in between icing sessions  • Take short, frequent walks increasing the distance that you walk each day as tolerated.  • Change your position every hour to decrease pain and stiffness.  • Continue the exercises taught to you by your physical therapist.  • No driving until cleared by the doctor.  • No tub baths, hot tubs, or swimming pools until instructed by your doctor.  • Do not squat down on the floor.  • Do not kneel or twist your knee.  • Range of Motion Goals: Flexion= 120 degrees, Extension = 0 degrees  Daily dressing changes if incision is not completely dry.  If inicision is dry, it may be left open to air.  Keep incision clean. DO NOT APPLY ANYTHING to incision site (salves/ointments/creams).  May shower post-op if no drainage from incision.  Do not scrub incision site. Pat dry after shower.  Prineo tape removal 2 weeks after surgery at Surgeon's office.

## 2024-10-10 ENCOUNTER — OFFICE (OUTPATIENT)
Dept: URBAN - METROPOLITAN AREA CLINIC 100 | Facility: CLINIC | Age: 69
Setting detail: OPHTHALMOLOGY
End: 2024-10-10
Payer: MEDICARE

## 2024-10-10 DIAGNOSIS — H43.393: ICD-10-CM

## 2024-10-10 DIAGNOSIS — H35.372: ICD-10-CM

## 2024-10-10 DIAGNOSIS — H35.413: ICD-10-CM

## 2024-10-10 DIAGNOSIS — H43.813: ICD-10-CM

## 2024-10-10 DIAGNOSIS — Z96.1: ICD-10-CM

## 2024-10-10 DIAGNOSIS — H52.7: ICD-10-CM

## 2024-10-10 DIAGNOSIS — H26.493: ICD-10-CM

## 2024-10-10 PROCEDURE — 92134 CPTRZ OPH DX IMG PST SGM RTA: CPT | Performed by: OPHTHALMOLOGY

## 2024-10-10 PROCEDURE — 92014 COMPRE OPH EXAM EST PT 1/>: CPT | Performed by: OPHTHALMOLOGY

## 2024-10-10 PROCEDURE — 92015 DETERMINE REFRACTIVE STATE: CPT | Performed by: OPHTHALMOLOGY

## 2024-10-10 ASSESSMENT — KERATOMETRY
OS_K1POWER_DIOPTERS: 43.50
OD_K2POWER_DIOPTERS: 44.00
OD_AXISANGLE_DEGREES: 090
OS_K2POWER_DIOPTERS: 44.00
OD_K1POWER_DIOPTERS: 43.50
OS_AXISANGLE_DEGREES: 102

## 2024-10-10 ASSESSMENT — REFRACTION_MANIFEST
OS_AXIS: 105
OU_VA: 20/20
OD_VA1: 20/40+2
OS_CYLINDER: -0.25
OS_ADD: +2.25
OS_CYLINDER: -0.25
OD_AXIS: 180
OD_SPHERE: -0.25
OD_VA1: 20/40+2
OS_ADD: +2.50
OD_CYLINDER: -0.25
OD_ADD: +2.25
OS_AXIS: 105
OS_VA1: 20/20-1
OU_VA: 20/20
OD_AXIS: 180
OD_SPHERE: -0.25
OS_SPHERE: -0.50
OD_ADD: +2.50
OS_SPHERE: -0.50
OD_CYLINDER: -0.25
OS_VA1: 20/20-1

## 2024-10-10 ASSESSMENT — REFRACTION_CURRENTRX
OS_VPRISM_DIRECTION: SV
OS_ADD: +2.00
OD_ADD: +2.00
OS_OVR_VA: 20/
OD_OVR_VA: 20/
OD_VPRISM_DIRECTION: SV

## 2024-10-10 ASSESSMENT — REFRACTION_AUTOREFRACTION
OS_CYLINDER: -0.25
OD_CYLINDER: -0.25
OD_AXIS: 178
OS_AXIS: 103
OD_SPHERE: -0.25
OS_SPHERE: -0.50

## 2024-10-10 ASSESSMENT — VISUAL ACUITY
OS_BCVA: 20/40+1
OD_BCVA: 20/40+2

## 2024-10-10 ASSESSMENT — TONOMETRY
OD_IOP_MMHG: 16
OS_IOP_MMHG: 16

## 2024-10-10 ASSESSMENT — CONFRONTATIONAL VISUAL FIELD TEST (CVF)
OD_FINDINGS: FULL
OS_FINDINGS: FULL

## 2024-10-26 ENCOUNTER — NON-APPOINTMENT (OUTPATIENT)
Age: 69
End: 2024-10-26

## 2025-05-22 ENCOUNTER — NON-APPOINTMENT (OUTPATIENT)
Age: 70
End: 2025-05-22

## 2025-06-13 ENCOUNTER — NON-APPOINTMENT (OUTPATIENT)
Age: 70
End: 2025-06-13

## (undated) DEVICE — SOL IRR POUR H2O 1500ML

## (undated) DEVICE — IRRISEPT JET LAVAGE W 0.05 PCT CHG

## (undated) DEVICE — DRSG COMBINE 5X9"

## (undated) DEVICE — SYR LUER LOK 50CC

## (undated) DEVICE — PACK TOTAL KNEE

## (undated) DEVICE — NDL SPINAL 18G X 3.5"

## (undated) DEVICE — SUT MONOSOF 3-0 30" C-16

## (undated) DEVICE — BLANKET WARMER UPPER ADULT

## (undated) DEVICE — SUT VICRYL PLUS 2-0 27" CP-1 UNDYED

## (undated) DEVICE — SOLIDIFIER CANN EXPRESS 3K

## (undated) DEVICE — HOOD FLYTE STRYKER HELMET SHIELD

## (undated) DEVICE — KIT OPTIVAC CEMENT MIXER 40GM

## (undated) DEVICE — SYM-STRYKER SYSTEM 7: Type: DURABLE MEDICAL EQUIPMENT

## (undated) DEVICE — SUT MONOCRYL 3-0 18" PS-1

## (undated) DEVICE — DEVICE ORTHALIGN PLUS

## (undated) DEVICE — SUT VICRYL PLUS 1 27" CP UNDYED

## (undated) DEVICE — GOWN XL W TOWEL

## (undated) DEVICE — SUT DERMABOND PRINEO 60CM

## (undated) DEVICE — KNEEALIGN TIBIAL AND FEMORAL

## (undated) DEVICE — DRSG XEROFORM 1"

## (undated) DEVICE — DRSG 4X4

## (undated) DEVICE — WRAP COMPRESSION CALF MED

## (undated) DEVICE — CONTAINER SPECIMEN PET

## (undated) DEVICE — ELCTR PENCIL NEPTUNE SMOKE EVACUATION

## (undated) DEVICE — SOL IRR POUR NS 0.9% 500ML

## (undated) DEVICE — SOL IRR BAG NS 0.9% 3000ML

## (undated) DEVICE — SEALER BIPOLAR 6.0 AQUAMANTYS

## (undated) DEVICE — SYR LUER LOK 20CC

## (undated) DEVICE — CUFF TOURNIQUET 34" DUAL PORT W PLC